# Patient Record
Sex: FEMALE | Race: WHITE | NOT HISPANIC OR LATINO | Employment: FULL TIME | URBAN - METROPOLITAN AREA
[De-identification: names, ages, dates, MRNs, and addresses within clinical notes are randomized per-mention and may not be internally consistent; named-entity substitution may affect disease eponyms.]

---

## 2019-03-21 ENCOUNTER — HISTORICAL (OUTPATIENT)
Dept: ADMINISTRATIVE | Facility: HOSPITAL | Age: 59
End: 2019-03-21

## 2019-03-21 LAB
ABS NEUT (OLG): 2.8 X10(3)/MCL (ref 2.1–9.2)
ALBUMIN SERPL-MCNC: 4.2 GM/DL (ref 3.4–5)
ALBUMIN/GLOB SERPL: 2.21 {RATIO} (ref 1.5–2.5)
ALP SERPL-CCNC: 71 UNIT/L (ref 38–126)
ALT SERPL-CCNC: 17 UNIT/L (ref 7–52)
AST SERPL-CCNC: 16 UNIT/L (ref 15–37)
BILIRUB SERPL-MCNC: 0.5 MG/DL (ref 0.2–1)
BILIRUBIN DIRECT+TOT PNL SERPL-MCNC: 0.1 MG/DL (ref 0–0.5)
BILIRUBIN DIRECT+TOT PNL SERPL-MCNC: 0.4 MG/DL
BUN SERPL-MCNC: 19 MG/DL (ref 7–18)
CALCIUM SERPL-MCNC: 8.8 MG/DL (ref 8.5–10)
CHLORIDE SERPL-SCNC: 106 MMOL/L (ref 98–107)
CO2 SERPL-SCNC: 28 MMOL/L (ref 21–32)
CREAT SERPL-MCNC: 0.72 MG/DL (ref 0.6–1.3)
ERYTHROCYTE [DISTWIDTH] IN BLOOD BY AUTOMATED COUNT: 12.5 % (ref 11.5–17)
GLOBULIN SER-MCNC: 1.9 GM/DL (ref 1.2–3)
GLUCOSE SERPL-MCNC: 112 MG/DL (ref 74–106)
HCT VFR BLD AUTO: 40.4 % (ref 37–47)
HGB BLD-MCNC: 13.6 GM/DL (ref 12–16)
LYMPHOCYTES # BLD AUTO: 2 X10(3)/MCL (ref 0.6–3.4)
LYMPHOCYTES NFR BLD AUTO: 35.3 % (ref 13–40)
MCH RBC QN AUTO: 31.7 PG (ref 27–31.2)
MCHC RBC AUTO-ENTMCNC: 34 GM/DL (ref 32–36)
MCV RBC AUTO: 94 FL (ref 80–94)
MONOCYTES # BLD AUTO: 0.8 X10(3)/MCL (ref 0.1–1.3)
MONOCYTES NFR BLD AUTO: 14 % (ref 0.1–24)
NEUTROPHILS NFR BLD AUTO: 50.7 % (ref 47–80)
PLATELET # BLD AUTO: 230 X10(3)/MCL (ref 130–400)
PMV BLD AUTO: 10.5 FL (ref 9.4–12.4)
POTASSIUM SERPL-SCNC: 4.3 MMOL/L (ref 3.5–5.1)
PROT SERPL-MCNC: 6.1 GM/DL (ref 6.4–8.2)
RBC # BLD AUTO: 4.29 X10(6)/MCL (ref 4.2–5.4)
SODIUM SERPL-SCNC: 133 MMOL/L (ref 136–145)
WBC # SPEC AUTO: 5.6 X10(3)/MCL (ref 4.5–11.5)

## 2019-04-23 ENCOUNTER — HISTORICAL (OUTPATIENT)
Dept: ADMINISTRATIVE | Facility: HOSPITAL | Age: 59
End: 2019-04-23

## 2019-04-23 LAB
APPEARANCE, UA: CLEAR
BACTERIA #/AREA URNS AUTO: ABNORMAL /HPF
BILIRUB UR QL STRIP: NEGATIVE MG/DL
CHOLEST SERPL-MCNC: 216 MG/DL (ref 0–200)
CHOLEST/HDLC SERPL: 4.1 {RATIO}
COLOR UR: ABNORMAL
GLUCOSE (UA): NEGATIVE MG/DL
HDLC SERPL-MCNC: 53 MG/DL (ref 35–60)
HGB UR QL STRIP: ABNORMAL UNIT/L
KETONES UR QL STRIP: NEGATIVE MG/DL
LDLC SERPL CALC-MCNC: 129 MG/DL (ref 0–129)
LEUKOCYTE ESTERASE UR QL STRIP: NEGATIVE UNIT/L
NITRITE UR QL STRIP.AUTO: NEGATIVE
PH UR STRIP: 5 [PH]
PROT UR QL STRIP: NEGATIVE MG/DL
RBC #/AREA URNS HPF: ABNORMAL /HPF
SP GR UR STRIP: <1.005
SQUAMOUS EPITHELIAL, UA: ABNORMAL /LPF
TRIGL SERPL-MCNC: 119 MG/DL (ref 30–150)
UROBILINOGEN UR STRIP-ACNC: 0.2 MG/DL
VLDLC SERPL CALC-MCNC: 23.8 MG/DL
WBC #/AREA URNS AUTO: ABNORMAL /[HPF]

## 2019-07-17 ENCOUNTER — HISTORICAL (OUTPATIENT)
Dept: ADMINISTRATIVE | Facility: HOSPITAL | Age: 59
End: 2019-07-17

## 2019-07-17 LAB
T4 FREE SERPL-MCNC: 1.37 NG/DL (ref 0.76–1.46)
TSH SERPL-ACNC: 2.07 MIU/ML (ref 0.35–4.94)

## 2020-04-22 ENCOUNTER — HISTORICAL (OUTPATIENT)
Dept: ADMINISTRATIVE | Facility: HOSPITAL | Age: 60
End: 2020-04-22

## 2020-04-22 LAB
ABS NEUT (OLG): 4.4 X10(3)/MCL (ref 2.1–9.2)
ALBUMIN SERPL-MCNC: 4.4 GM/DL (ref 3.4–5)
ALBUMIN/GLOB SERPL: 1.83 {RATIO} (ref 1.5–2.5)
ALP SERPL-CCNC: 79 UNIT/L (ref 38–126)
ALT SERPL-CCNC: 24 UNIT/L (ref 7–52)
APPEARANCE, UA: ABNORMAL
AST SERPL-CCNC: 19 UNIT/L (ref 15–37)
BACTERIA #/AREA URNS AUTO: ABNORMAL /HPF
BILIRUB SERPL-MCNC: 0.7 MG/DL (ref 0.2–1)
BILIRUB UR QL STRIP: NEGATIVE MG/DL
BILIRUBIN DIRECT+TOT PNL SERPL-MCNC: 0.1 MG/DL (ref 0–0.5)
BILIRUBIN DIRECT+TOT PNL SERPL-MCNC: 0.6 MG/DL
BUN SERPL-MCNC: 17 MG/DL (ref 7–18)
CALCIUM SERPL-MCNC: 9.1 MG/DL (ref 8.5–10)
CHLORIDE SERPL-SCNC: 106 MMOL/L (ref 98–107)
CHOLEST SERPL-MCNC: 206 MG/DL (ref 0–200)
CHOLEST/HDLC SERPL: 4.2 {RATIO}
CO2 SERPL-SCNC: 29 MMOL/L (ref 21–32)
COLOR UR: YELLOW
CREAT SERPL-MCNC: 0.69 MG/DL (ref 0.6–1.3)
ERYTHROCYTE [DISTWIDTH] IN BLOOD BY AUTOMATED COUNT: 13.2 % (ref 11.5–17)
EST. AVERAGE GLUCOSE BLD GHB EST-MCNC: 111 MG/DL
GLOBULIN SER-MCNC: 2.4 GM/DL (ref 1.2–3)
GLUCOSE (UA): NEGATIVE MG/DL
GLUCOSE SERPL-MCNC: 104 MG/DL (ref 74–106)
HBA1C MFR BLD: 5.5 % (ref 4.4–6.4)
HCT VFR BLD AUTO: 40.6 % (ref 37–47)
HDLC SERPL-MCNC: 49 MG/DL (ref 35–60)
HGB BLD-MCNC: 13.1 GM/DL (ref 12–16)
HGB UR QL STRIP: ABNORMAL UNIT/L
KETONES UR QL STRIP: NEGATIVE MG/DL
LDLC SERPL CALC-MCNC: 133 MG/DL (ref 0–129)
LEUKOCYTE ESTERASE UR QL STRIP: ABNORMAL UNIT/L
LYMPHOCYTES # BLD AUTO: 2.6 X10(3)/MCL (ref 0.6–3.4)
LYMPHOCYTES NFR BLD AUTO: 31.9 % (ref 13–40)
MCH RBC QN AUTO: 30.4 PG (ref 27–31.2)
MCHC RBC AUTO-ENTMCNC: 32 GM/DL (ref 32–36)
MCV RBC AUTO: 94 FL (ref 80–94)
MONOCYTES # BLD AUTO: 1 X10(3)/MCL (ref 0.1–1.3)
MONOCYTES NFR BLD AUTO: 12.1 % (ref 0.1–24)
NEUTROPHILS NFR BLD AUTO: 56 % (ref 47–80)
NITRITE UR QL STRIP.AUTO: NEGATIVE
PH UR STRIP: 5 [PH]
PLATELET # BLD AUTO: 245 X10(3)/MCL (ref 130–400)
PMV BLD AUTO: 10.1 FL (ref 9.4–12.4)
POTASSIUM SERPL-SCNC: 4.4 MMOL/L (ref 3.5–5.1)
PROT SERPL-MCNC: 6.8 GM/DL (ref 6.4–8.2)
PROT UR QL STRIP: NEGATIVE MG/DL
RBC # BLD AUTO: 4.31 X10(6)/MCL (ref 4.2–5.4)
RBC #/AREA URNS HPF: ABNORMAL /HPF
SODIUM SERPL-SCNC: 138 MMOL/L (ref 136–145)
SP GR UR STRIP: 1.01
SQUAMOUS EPITHELIAL, UA: ABNORMAL /LPF
TRIGL SERPL-MCNC: 82 MG/DL (ref 30–150)
UROBILINOGEN UR STRIP-ACNC: 0.2 MG/DL
VLDLC SERPL CALC-MCNC: 16.4 MG/DL
WBC # SPEC AUTO: 8 X10(3)/MCL (ref 4.5–11.5)
WBC #/AREA URNS AUTO: ABNORMAL /[HPF]

## 2020-04-28 ENCOUNTER — HISTORICAL (OUTPATIENT)
Dept: LAB | Facility: HOSPITAL | Age: 60
End: 2020-04-28

## 2021-01-29 ENCOUNTER — HISTORICAL (OUTPATIENT)
Dept: ADMINISTRATIVE | Facility: HOSPITAL | Age: 61
End: 2021-01-29

## 2021-05-11 ENCOUNTER — HISTORICAL (OUTPATIENT)
Dept: ADMINISTRATIVE | Facility: HOSPITAL | Age: 61
End: 2021-05-11

## 2022-04-10 ENCOUNTER — HISTORICAL (OUTPATIENT)
Dept: ADMINISTRATIVE | Facility: HOSPITAL | Age: 62
End: 2022-04-10

## 2022-04-26 VITALS
OXYGEN SATURATION: 96 % | DIASTOLIC BLOOD PRESSURE: 64 MMHG | BODY MASS INDEX: 23.65 KG/M2 | HEIGHT: 68 IN | SYSTOLIC BLOOD PRESSURE: 104 MMHG | WEIGHT: 156.06 LBS

## 2022-05-02 NOTE — HISTORICAL [facility] CERNER
This is a historical note converted from Pradeep. Formatting and pictures may have been removed.  Please reference Pradeep for original formatting and attached multimedia. Chief Complaint  SYNCOPAL EPISODE LAST WEEK. ?FELL AND HIT HER LOWER BACK ON A CHAIR & HAS HAD PAIN THERE EVER SINCE. ?SHE NOW HAS PAIN WITH INSPIRATION.  History of Present Illness  She has been quite fatigued. She may be burned out. She works at least 10 hours a day. 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 She is having a lot of discomfort in her lower back for 3-4 weeks. She fell last week. Her pain in on the left side. Continuous. Hurts with breathing. She is resting. She can walk and lie without problems. She has discomfort with sitting and getting up.  Review of Systems  See HPI.  Physical Exam  Vitals & Measurements  T:?36.7? ?C (Oral)? HR:?96(Peripheral)? BP:?120/60?  HT:?170?cm? WT:?72?kg? BMI:?24.91?  General:?She is a well-developed well-nourished anxious white female no apparent distress.? She has a normal gait and is able to transfer without problems.  Low back:?Normal in appearance, no skin lesions,?she has good flexion?at her waist. ?She has somewhat limited?extension. ?She has slight discomfort with lateral flexion to right.? She has a negative?SLR bilaterally.  Assessment/Plan  1.?Low back pain?M54.5  X-rays pending.? She appears to have?osteoarthritis in her lumbar spine.  Meloxicam 7.5 mg daily. ?She is to call me in 2 weeks with an update.? She is to call me sooner if her symptoms worsen.  Ordered:  Free T4, Routine collect, 07/17/19 16:10:00 CDT, Blood, Order for future visit, Stop date 07/17/19 16:10:00 CDT, Lab Collect, Low back pain  Fatigue, 07/17/19 16:10:00 CDT  Office/Outpatient Visit Level 3 Established [id number] PC, Low back pain  Fatigue, [facility], 07/17/19 16:10:00 CDT  Thyroid Stimulating Hormone, Routine collect, 07/17/19 16:10:00 CDT, Blood, Order for future visit, Stop date 07/17/19 16:10:00 CDT, Lab Collect, Low back pain   Fatigue, 07/17/19 16:10:00 CDT  XR Spine Lumbar 2 or 3 Views, Routine, 07/17/19 15:53:00 CDT, Back Pain, None, Stretcher, Patient Has IV?, Rad Type, Low back pain, [facility], 07/17/19 15:53:00 CDT  ?  2.?Fatigue?R53.83  ?Probably secondary to her work schedule.? She is probably also experiencing some mental fatigue.? Patient advised to get more rest.? Will check?TSH and free T4 to rule out thyroid dysfunction.  Ordered:  Free T4, Routine collect, 07/17/19 16:10:00 CDT, Blood, Order for future visit, Stop date 07/17/19 16:10:00 CDT, Lab Collect, Low back pain  Fatigue, 07/17/19 16:10:00 CDT  Office/Outpatient Visit Level 3 Established [id number] PC, Low back pain  Fatigue, [facility], 07/17/19 16:10:00 CDT  Thyroid Stimulating Hormone, Routine collect, 07/17/19 16:10:00 CDT, Blood, Order for future visit, Stop date 07/17/19 16:10:00 CDT, Lab Collect, Low back pain  Fatigue, 07/17/19 16:10:00 CDT  ?  Orders:  meloxicam, 7.5 mg = 1 tab(s), Oral, Daily, # 30 tab(s), 2 Refill(s), Pharmacy: [id number]  Clinic Follow-up PRN, 07/17/19 16:10:00 CDT, [facility] - [facility], Future Order  Referrals  Clinic Follow-up PRN, 07/17/19 16:10:00 CDT, [facility] - [facility], Future Order   Problem List/Past Medical History  Ongoing  Fatigue  Hypercholesterolemia  Low back pain  Historical  Able to lie down  Activity tolerance  Cataracts  Glasses  Hypertension  Insomnia  Procedure/Surgical History  34299 - LT CATARACT W/IOL 1 STA PHACO (Left) (07/10/2015)  Extracapsular cataract removal with insertion of intraocular lens prosthesis (1 stage procedure), manual or mechanical technique (eg, irrigation and aspiration or phacoemulsification) (07/10/2015)  Insertion of intraocular lens prosthesis at time of cataract extraction, one-stage (07/10/2015)  Phacoemulsification and aspiration of cataract (07/10/2015)  04130 - RT CATARACT W/IOL 1 STA PHACO (Right) (06/30/2015)  Extracapsular cataract removal with insertion of  intraocular lens prosthesis (1 stage procedure), manual or mechanical technique (eg, irrigation and aspiration or phacoemulsification) (06/30/2015)  Insertion of intraocular lens prosthesis at time of cataract extraction, one-stage (06/30/2015)  Phacoemulsification and aspiration of cataract (06/30/2015)  Colonoscopy (12/16/2013)  Hysterectomy (09.2011)  Appendectomy  benign breast lump   Medications  meloxicam 7.5 mg oral tablet, 7.5 mg= 1 tab(s), Oral, Daily, 2 refills  simvastatin 40 mg oral tablet, See Instructions, 3 refills  Allergies  No Known Medication Allergies  Social History  Abuse/Neglect  No, 07/17/2019  Alcohol - Low Risk, 06/29/2015  Current, Wine, 1-2 times per month, 06/29/2015  Employment/School - Low Risk, 06/30/2015  Employed, Work/School description: housekeeping., 06/30/2015  Exercise  Home/Environment - Low Risk, 06/29/2015  Lives with Spouse., 06/29/2015  Nutrition/Health - No Risk, 07/10/2015  Regular, Caffeine intake amount: 2 CUPS OF COFFEE AND 1 SODA PER DAY., 04/23/2019  Substance Use - Denies Substance Abuse, 06/29/2015  Never, 04/23/2019  Tobacco - High Risk, 06/29/2015  10 or more cigarettes (1/2 pack or more)/day in last 30 days, No, 07/17/2019  10 or more cigarettes (1/2 pack or more)/day in last 30 days, Cigarettes, No, 04/23/2019  Current every day smoker, Cigarettes, 15 per day., 06/29/2015  Family History  Heart disease: Father.Negative: Mother.  Metastatic cancer: Mother.  Immunizations  Vaccine Date Status   pneumococcal 13-valent conjugate vaccine 05/31/2016 Recorded   tetanus-diphth toxoids (Td) adult/adol 05/26/2016 Recorded   influenza virus vaccine, inactivated 08/21/2014 Recorded   Health Maintenance  Health Maintenance  ???Pending?(in the next year)  ??? ??OverDue  ??? ? ? ?Diabetes Screening due??and every?  ??? ? ? ?Cervical Cancer Screening due??05/30/14??and every 3??year(s)  ??? ? ? ?Colorectal Screening due??12/16/18??and every 5??year(s)  ??? ? ? ?Alcohol Misuse  Screening due??01/01/19??and every 1??year(s)  ??? ??Due?  ??? ? ? ?ADL Screening due??07/17/19??and every 1??year(s)  ??? ? ? ?Aspirin Therapy for CVD Prevention due??07/17/19??and every 1??year(s)  ??? ? ? ?Depression Screening due??07/17/19??and every?  ??? ??Due In Future?  ??? ? ? ?Obesity Screening not due until??01/01/20??and every 1??year(s)  ??? ? ? ?Blood Pressure Screening not due until??07/16/20??and every 1??year(s)  ??? ? ? ?Body Mass Index Check not due until??07/16/20??and every 1??year(s)  ???Satisfied?(in the past 1 year)  ??? ??Satisfied?  ??? ? ? ?Blood Pressure Screening on??07/17/19.??Satisfied by Shiela Vences LPN  ??? ? ? ?Body Mass Index Check on??07/17/19.??Satisfied by Shiela Vences LPN  ??? ? ? ?Diabetes Screening on??03/21/19.??Satisfied by Lauri Bucio  ??? ? ? ?Lipid Screening on??04/23/19.??Satisfied by Lauri Bucio  ??? ? ? ?Obesity Screening on??07/17/19.??Satisfied by Shiela Vences LPN  ?      X-rays reveal advanced degenerative disc disease at L2-L3.

## 2022-05-02 NOTE — HISTORICAL [facility] CERNER
This is a historical note converted from Cerner. Formatting and pictures may have been removed.  Please reference Cerner for original formatting and attached multimedia. Chief Complaint  CPX/FASTING-NPO  History of Present Illness  Patient presents for wellness exam.  She lives with .  She has been feeling well.  She admits to insomnia intermittently. She is taking trazodone as needed but notes daytime grogginess and vivid dreams.?  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 She had shoulder surgery in 2017 but continues to have pain when working.  Her appetite is good.  She works in housekeeping.??She is active; she does yard work.  She has a few beers a week.  She smokes 7-8 cigarettes daily.  She has 2 cups of coffee and one soda a day.  Colonoscopy: Dr Nair, March 2018-she is unsure of when to return.  GYN:?Dr Irby; Gs UTD.  Review of Systems  GENERAL: No weight loss, no?weight gain, no fever, no fatigue, no chills, +night sweats-menopausal  HEENT: No sore throat, no ear pain, no sinus pressure, no nasal congestion, no rhinorrhea, no decreased hearing, no snoring  VISION: No vision changes, no blurry vision, no double vision, no glaucoma, no cataracts, +glasses, no contacts  LAST EYE EXAM: 2019  NECK: no LAD  CARDIAC: No chest pain, no palpitations, no dyspnea on exertion, no orthopnea  RESPIRATORY: No cough, no wheezing, no sputum production, no?SOB  GI: No abdominal pain, no N/V, no constipation, no diarrhea, no blood in stool,?( -)?family history of Colon Ca  : No dysuria, no hematuria, no frequency, no urgency, no incontinence, +nocturia,?no vaginal discharge or abnormal vaginal bleeding  MUSC/SKEL: No myalgia, no weakness, no edema, no arthralgia, no joint swelling/effusions, +chronic low back pain, +chronic left shoulder pain  SKIN: No rashes, no hives, no itching, no sores  NEURO: No HA, no numbness, no tingling, no weakness, no dizziness  PSYCH: No  anxiety, no depression, no?irritability, no panic attacks,?no s/i, no h/i, no?hallucinations, +intermittent insomnia  ENDO: No polyuria, no polyphagia,? no polydipsia  HEME: No bruising, no bleeding disorders, no signs of anemia.?  Physical Exam  Vitals & Measurements  T:?36.6? ?C (Oral)? HR:?80(Peripheral)? BP:?120/80?  HT:?170.17?cm? WT:?70.9?kg? BMI:?24.48?  General: Well developed, well nourished in no apparent distress, alert and oriented x3  Skin:?No rash or abnormal?lesions  HEENT:?Normocephalic, PERRLA, EOMI, mouth WNL, throat WNL, nares normal, EAC and TM WNL bilaterally  Neck:?FROM, no LAD, no thyroid abnormalities?palpable  Chest:?CTA?bilaterally, no wheezes crackles or rubs  Cardiac: RRR,?no murmurs, rubs, gallops  Abdomen: Soft, nontender,?nondistended, NBSx4, no rebound tenderness or guarding, no HSM  Extremities:?No clubbing, cyanosis, or edema.? Joints WNL, +2 DP/PT pulses bilaterally  Neuro: No sensory or motor defects noted. CN II-XII intact. Gait WNL.  Genital: Defer to GYN  Shoulder: Normal appearance, no limitation of abduction,?no limited internal rotation,?pain with external rotation, - Neers test, - Franks test, - Speeds test, - Liftoff test.?  Assessment/Plan  1.?Wellness examination?Z00.00  CBC, CMP, Lipids, UA, A1C ordered.  Encourage smoking cessation.  Requested colonoscopy results from 2018 with?Korey FERGUSON.  Encourage pt to increase cardiovascular exercise and attempt to obtain at least 150 minutes of moderate aerobic exercise per week or 75 minutes of vigorous aerobic exercise weekly.??  Weight bearing activities discussed to increase bone density.  Ordered:  CBC w/ Auto Diff, Routine collect, 04/22/20 10:46:00 CDT, Blood, Order for future visit, Stop date 04/22/20 10:46:00 CDT, Lab Collect, Wellness examination  Hypercholesterolemia  Insomnia, 04/22/20 10:46:00 CDT  Clinic Follow-Up Wellness, *Est. 04/22/21 3:00:00 CDT, Order for future visit, Wellness examination, ink  [facility]  Comprehensive Metabolic Panel, Routine collect, 04/22/20 10:46:00 CDT, Blood, Order for future visit, Stop date 04/22/20 10:46:00 CDT, Lab Collect, Wellness examination  Hypercholesterolemia  Hyperglycemia  Insomnia, 04/22/20 10:46:00 CDT  Hemoglobin A1c, Routine collect, 04/22/20 10:46:00 CDT, Blood, Order for future visit, Stop date 04/22/20 10:46:00 CDT, Lab Collect, Wellness examination  Hyperglycemia  Hypercholesterolemia, 04/22/20 10:46:00 CDT  Lab Collection Request, 04/22/20 10:46:00 CDT, [facility] - [facility], 04/22/20 10:46:00 CDT, Wellness examination  Hypercholesterolemia  Hyperglycemia  Insomnia  Lipid Panel, Routine collect, 04/22/20 10:46:00 CDT, Blood, Order for future visit, Stop date 04/22/20 10:46:00 CDT, Lab Collect, Wellness examination  Hypercholesterolemia, 04/22/20 10:46:00 CDT  Preventative Health Care Est 40-64 years [id number] PC, Wellness examination  Hypercholesterolemia  Hyperglycemia  Insomnia  Immunization due, [facility] - [facility], 04/22/20 10:49:00 CDT  Urinalysis without Reflex, Routine collect, Urine, Order for future visit, 04/22/20 10:46:00 CDT, Stop date 04/22/20 10:46:00 CDT, Nurse collect, Wellness examination  Hyperglycemia  ?  2.?Hypercholesterolemia?E78.00  Continue current medicine. ?Follow low-cholesterol diet with?physical activity.??  Lipids ordered.  Ordered:  CBC w/ Auto Diff, Routine collect, 04/22/20 10:46:00 CDT, Blood, Order for future visit, Stop date 04/22/20 10:46:00 CDT, Lab Collect, Wellness examination  Hypercholesterolemia  Insomnia, 04/22/20 10:46:00 CDT  Comprehensive Metabolic Panel, Routine collect, 04/22/20 10:46:00 CDT, Blood, Order for future visit, Stop date 04/22/20 10:46:00 CDT, Lab Collect, Wellness examination  Hypercholesterolemia  Hyperglycemia  Insomnia, 04/22/20 10:46:00 CDT  Hemoglobin A1c, Routine collect, 04/22/20 10:46:00 CDT, Blood, Order for future visit, Stop date 04/22/20 10:46:00 CDT, Lab Collect, Wellness examination   Hyperglycemia  Hypercholesterolemia, 04/22/20 10:46:00 CDT  Lab Collection Request, 04/22/20 10:46:00 CDT, [facility] - [facility], 04/22/20 10:46:00 CDT, Wellness examination  Hypercholesterolemia  Hyperglycemia  Insomnia  Lipid Panel, Routine collect, 04/22/20 10:46:00 CDT, Blood, Order for future visit, Stop date 04/22/20 10:46:00 CDT, Lab Collect, Wellness examination  Hypercholesterolemia, 04/22/20 10:46:00 CDT  Preventative Health Care Est 40-64 years 04381 PC, Wellness examination  Hypercholesterolemia  Hyperglycemia  Insomnia  Immunization due, [facility] - [facility], 04/22/20 10:49:00 CDT  ?  3.?Hyperglycemia?R73.9  A1C ordered.  Ordered:  Comprehensive Metabolic Panel, Routine collect, 04/22/20 10:46:00 CDT, Blood, Order for future visit, Stop date 04/22/20 10:46:00 CDT, Lab Collect, Wellness examination  Hypercholesterolemia  Hyperglycemia  Insomnia, 04/22/20 10:46:00 CDT  Hemoglobin A1c, Routine collect, 04/22/20 10:46:00 CDT, Blood, Order for future visit, Stop date 04/22/20 10:46:00 CDT, Lab Collect, Wellness examination  Hyperglycemia  Hypercholesterolemia, 04/22/20 10:46:00 CDT  Lab Collection Request, 04/22/20 10:46:00 CDT, [facility] - [facility], 04/22/20 10:46:00 CDT, Wellness examination  Hypercholesterolemia  Hyperglycemia  Insomnia  Preventative Health Care Est 40-64 years 48131 PC, Wellness examination  Hypercholesterolemia  Hyperglycemia  Insomnia  Immunization due, [facility] - [facility], 04/22/20 10:49:00 CDT  Urinalysis without Reflex, Routine collect, Urine, Order for future visit, 04/22/20 10:46:00 CDT, Stop date 04/22/20 10:46:00 CDT, Nurse collect, Wellness examination  Hyperglycemia  ?  4.?Insomnia?G47.00  Will try trial of?hydroxyzine 25 mg at bedtime as needed.  Informed patient?to call the office if persist?or worsens despite medications.  Ordered:  CBC w/ Auto Diff, Routine collect, 04/22/20 10:46:00 CDT, Blood, Order for future visit, Stop date 04/22/20 10:46:00 CDT, Lab Collect,  Wellness examination  Hypercholesterolemia  Insomnia, 04/22/20 10:46:00 CDT  Comprehensive Metabolic Panel, Routine collect, 04/22/20 10:46:00 CDT, Blood, Order for future visit, Stop date 04/22/20 10:46:00 CDT, Lab Collect, Wellness examination  Hypercholesterolemia  Hyperglycemia  Insomnia, 04/22/20 10:46:00 CDT  Lab Collection Request, 04/22/20 10:46:00 CDT, [facility] - [facility], 04/22/20 10:46:00 CDT, Wellness examination  Hypercholesterolemia  Hyperglycemia  Insomnia  Preventative Health Care Est 40-64 years 84734 PC, Wellness examination  Hypercholesterolemia  Hyperglycemia  Insomnia  Immunization due, [facility] - [facility], 04/22/20 10:49:00 CDT  ?  5.?Immunization due?Z23  She declines Shingrix vaccine; given risks.  Ordered:  Preventative Health Care Est 40-64 years [id number] PC, Wellness examination  Hypercholesterolemia  Hyperglycemia  Insomnia  Immunization due, [facility] - [facility], 04/22/20 10:49:00 CDT  ?  6.?Shoulder pain, left?M25.512  Refilled Mobic to take as needed for pain.  She declines?further work-up or x-rays at this time.  ?  Orders:  hydrOXYzine, 25 mg = 1 tab(s), Oral, Daily, PRN PRN insomnia, # 30 tab(s), 1 Refill(s), Pharmacy: [id number], 170.17, cm, Height/Length Dosing, 04/22/20 10:02:00 CDT, 70.9, kg, Weight Dosing, 04/22/20 10:02:00 CDT  meloxicam, 7.5 mg = 1 tab(s), Oral, Daily, PRN PRN pain, # 90 tab(s), 0 Refill(s), Pharmacy: [facility] ([place]), 170.17, cm, Height/Length Dosing, 04/22/20 10:02:00 CDT, 70.9, kg, Weight Dosing, 04/22/20 10:02:00 CDT  simvastatin, See Instructions, TAKE 1 TABLET BY MOUTH EVERY DAY IN THE EVENING, # 90 tab(s), 3 Refill(s), Pharmacy: [facility] ([place]), 170.17, cm, Height/Length Dosing, 04/22/20 10:02:00 CDT, 70.9, kg, Weight Dosing, 04/22/20 10:02:00 CDT  Referrals  Clinic Follow-Up Wellness, *Est. 04/22/21 3:00:00 CDT, Order for future visit, Wellness examination, [facility]   Problem List/Past Medical  History  Ongoing  Fatigue  Hypercholesterolemia  Low back pain  Historical  Able to lie down  Activity tolerance  Cataracts  Glasses  Hypertension  Insomnia  Procedure/Surgical History  32103 - LT CATARACT W/IOL 1 STA PHACO (Left) (07/10/2015)  Extracapsular cataract removal with insertion of intraocular lens prosthesis (1 stage procedure), manual or mechanical technique (eg, irrigation and aspiration or phacoemulsification) (07/10/2015)  Insertion of intraocular lens prosthesis at time of cataract extraction, one-stage (07/10/2015)  Phacoemulsification and aspiration of cataract (07/10/2015)  77545 - RT CATARACT W/IOL 1 STA PHACO (Right) (06/30/2015)  Extracapsular cataract removal with insertion of intraocular lens prosthesis (1 stage procedure), manual or mechanical technique (eg, irrigation and aspiration or phacoemulsification) (06/30/2015)  Insertion of intraocular lens prosthesis at time of cataract extraction, one-stage (06/30/2015)  Phacoemulsification and aspiration of cataract (06/30/2015)  Colonoscopy (12/16/2013)  Hysterectomy (09.2011)  Appendectomy  benign breast lump   Medications  hydrOXYzine hydrochloride 25 mg oral tablet, 25 mg= 1 tab(s), Oral, Daily, PRN, 1 refills  meloxicam 7.5 mg oral tablet, 7.5 mg= 1 tab(s), Oral, Daily, PRN  simvastatin 40 mg oral tablet, See Instructions, 3 refills  Allergies  No Known Medication Allergies  Social History  Abuse/Neglect  No, 04/22/2020  No, 07/17/2019  Alcohol - Low Risk, 06/29/2015  Current, Beer, 1-2 times per week, 04/22/2020  Current, Wine, 1-2 times per month, 06/29/2015  Employment/School - Low Risk, 06/30/2015  Employed, Work/School description: SELF EMPLOYED., 04/22/2020  Employed, Work/School description: housekeeping., 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  Exercise  Exercise duration: 0., 04/22/2020  Home/Environment - Low Risk, 06/29/2015  Lives with Spouse., 04/22/2020  Lives with Spouse., 06/29/2015  Nutrition/Health - No Risk, 07/10/2015  Regular, Good,  04/22/2020  Regular, Caffeine intake amount: 2 CUPS OF COFFEE AND 1 SODA PER DAY., 04/23/2019  Spiritual/Cultural  Mormonism, 04/22/2020  Substance Use - Denies Substance Abuse, 06/29/2015  Never, 04/22/2020  Never, 04/23/2019  Tobacco - High Risk, 06/29/2015  5-9 cigarettes (between 1/4 to 1/2 pack)/day in last 30 days, No, 04/22/2020  10 or more cigarettes (1/2 pack or more)/day in last 30 days, No, 07/17/2019  10 or more cigarettes (1/2 pack or more)/day in last 30 days, Cigarettes, No, 04/23/2019  Current every day smoker, Cigarettes, 15 per day., 06/29/2015  Family History  Heart disease: Father.Negative: Mother.  Metastatic cancer: Mother.  Immunizations  Vaccine Date Status   pneumococcal 13-valent conjugate vaccine 05/31/2016 Recorded   tetanus-diphth toxoids (Td) adult/adol 05/26/2016 Recorded   influenza virus vaccine, inactivated 08/21/2014 Recorded   Health Maintenance  Health Maintenance  ???Pending?(in the next year)  ??? ??OverDue  ??? ? ? ?Diabetes Screening due??and every?  ??? ? ? ?Cervical Cancer Screening due??05/30/14??and every 3??year(s)  ??? ? ? ?Alcohol Misuse Screening due??01/01/20??and every 1??year(s)  ??? ??Due?  ??? ? ? ?ADL Screening due??04/22/20??and every 1??year(s)  ??? ? ? ?Aspirin Therapy for CVD Prevention due??04/22/20??and every 1??year(s)  ??? ? ? ?Depression Screening due??04/22/20??and every?  ??? ??Due In Future?  ??? ? ? ?Breast Cancer Screening not due until??07/29/20??and every 2??year(s)  ??? ? ? ?Obesity Screening not due until??01/01/21??and every 1??year(s)  ???Satisfied?(in the past 1 year)  ??? ??Satisfied?  ??? ? ? ?Blood Pressure Screening on??04/22/20.??Satisfied by Osman Lopez LPN  ??? ? ? ?Body Mass Index Check on??04/22/20.??Satisfied by Osman Lopez LPN  ??? ? ? ?Obesity Screening on??04/22/20.??Satisfied by Osman Lopez LPN  ?      Patients condition discussed in detail with nurse practitioner.  I have?reviewed and agree with plan of care  and follow-up.

## 2022-05-02 NOTE — HISTORICAL [facility] CERNER
This is a historical note converted from Pradeep. Formatting and pictures may have been removed.  Please reference Pradeep for original formatting and attached multimedia. Chief Complaint  Cough, SOB- Smoker  History of Present Illness  She has had a dry cough. Her throat is dry and scratchy. ? Her chest is sore from coughing. + wheezing. No tightness. + dyspnea. She feels her voice is not right. Her nose runs when she coughs. No sneezing. She has been coughing for 6 weeks. No fever/chills.  She has been smoking 4-5 cigarettes/day.?The cough?keeps her up at night. ?She does admit to a lot of exposure to dust and cleaning chemicals.  Review of Systems  See HPI.  Physical Exam  Vitals & Measurements  T:?36.7? ?C (Oral)? HR:?87(Peripheral)? BP:?110/60? SpO2:?98%?  HT:?172.00?cm? WT:?70.600?kg? BMI:?23.86?  Gen: well developed, well nourished white femalewho?appears to be in no apparent distress.? She coughs regularly.  Head/Neck:? normocephalic, atraumatic, supple,-adenopathy,?-stiffness.?  Mouth/Oropharynx:?-normal dentition,?-tongue abnormalities,-mucosal lesions,? + erythema,-exudate,?-edema of posterior pharynx,-edema of tonsils,??  -?tonsillar exudate,+postnasal drainage,-cobblestoning.  Nares:?+erythema,-edema,-discharge.?  CV:? regular without murmurs, rubs, gallops.?  Chest:?clearto auscultation bilaterally,?-wheezing,-crackles,-rhonchi.?  ?  Assessment/Plan  1.?Cough?R05  Chest x-ray is normal.  Her spirometry is?within normal limits.  Trial of Symbicort?160/4.5: 2 puffs twice daily.  Codeine with guaifenesin: 1 teaspoon every 6 hours as needed for cough.?  Zyrtec or Allegra daily.  His cough may be secondary to allergic exposure?from dust?and/or cleaning chemicals.  Ordered:  Clinic Follow-up PRN, 05/11/21 16:43:00 CDT, [facility] - [facility], Future Order  Office/Outpatient Visit Level 4 Established 62220 PC, Cough  SOB (shortness of breath), [facility]K AMB - [facility], 05/11/21 16:43:00 CDT  XR Chest 2 Views, Routine,  05/11/21 15:52:00 CDT, Other (please specify), None, Stretcher, Patient Has IV?, Rad Type, Cough  SOB (shortness of breath), Not Scheduled, [facility], 05/11/21 15:52:00 CDT  ?  2.?SOB (shortness of breath)?R06.02  ?Patient experiences?some shortness of breath after?coughing spells.  Her spirometry is within normal limits.  Ordered:  Clinic Follow-up PRN, 05/11/21 16:43:00 CDT, [facility] - [facility], Future Order  Office/Outpatient Visit Level 4 Established 14803 PC, Cough  SOB (shortness of breath), [facility], 05/11/21 16:43:00 CDT  XR Chest 2 Views, Routine, 05/11/21 15:52:00 CDT, Other (please specify), None, Stretcher, Patient Has IV?, Rad Type, Cough  SOB (shortness of breath), Not Scheduled, [facility], 05/11/21 15:52:00 CDT  ?  Orders:  codeine-guaifenesin, 7.5 mL, Oral, q6hr, PRN PRN for cough, Quantity greater than 7 days is medically necessary., # 180 mL, 0 Refill(s), Pharmacy: [id number], 172, cm, Height/Length Dosing, 05/11/21 15:50:00 CDT, 70.6, kg, Weight Dosing, 05/11/21 15:50:00 CDT  hydrochlorothiazide-lisinopril, See Instructions, TAKE ONE TABLET BY MOUTH ONE TIME DAILY , # 30 tab(s), 5 Refill(s), Pharmacy: [id number], 172, cm, Height/Length Dosing, 05/11/21 15:50:00 CDT, 70.6, kg, Weight Dosing, 05/11/21 15:50:00 CDT  Referrals  Clinic Follow-up PRN, 05/11/21 16:43:00 CDT, [facility] - [facility], Future Order   Problem List/Past Medical History  Ongoing  Fatigue  Hypercholesterolemia  Hypertension  Low back pain  Historical  Able to lie down  Activity tolerance  Cataracts  Glasses  Hypertension  Insomnia  Procedure/Surgical History  Colonoscopy (03/01/2019)  99262 - LT CATARACT W/IOL 1 STA PHACO (Left) (07/10/2015)  Extracapsular cataract removal with insertion of intraocular lens prosthesis (1 stage procedure), manual or mechanical technique (eg, irrigation and aspiration or phacoemulsification) (07/10/2015)  Insertion of intraocular lens  prosthesis at time of cataract extraction, one-stage (07/10/2015)  Phacoemulsification and aspiration of cataract (07/10/2015)  13842 - RT CATARACT W/IOL 1 STA PHACO (Right) (06/30/2015)  Extracapsular cataract removal with insertion of intraocular lens prosthesis (1 stage procedure), manual or mechanical technique (eg, irrigation and aspiration or phacoemulsification) (06/30/2015)  Insertion of intraocular lens prosthesis at time of cataract extraction, one-stage (06/30/2015)  Phacoemulsification and aspiration of cataract (06/30/2015)  Colonoscopy (12/16/2013)  Hysterectomy (09.2011)  Appendectomy  benign breast lump   Medications  citalopram 40 mg oral tablet, 40 mg= 1 tab(s), Oral, Daily  citalopram 40 mg oral tablet, 40 mg= 1 tab(s), Oral, Daily, 1 refills  cloniDINE 0.1 mg oral tablet, 0.1 mg= 1 tab(s), Oral, BID, PRN  codeine-guaifenesin 7.5 mg-225 mg/5 mL oral liquid, 7.5 mL, Oral, q6hr, PRN  hydrochlorothiazide-lisinopril 12.5 mg-10 mg oral tablet, See Instructions  hydrochlorothiazide-lisinopril 12.5 mg-10 mg oral tablet, 1 tab(s), Oral, Daily  hydrOXYzine hydrochloride 25 mg oral tablet, 25 mg= 1 tab(s), Oral, Daily, PRN, 5 refills  meloxicam 7.5 mg oral tablet, 7.5 mg= 1 tab(s), Oral, Daily, PRN, 1 refills  simvastatin 40 mg oral tablet, See Instructions, 1 refills  traMADol 50 mg oral tablet, 50 mg= 1 tab(s), Oral, q6hr  Allergies  No Known Medication Allergies  Social History  Abuse/Neglect  No, 03/10/2021  Alcohol - Low Risk, 06/29/2015  Current, Beer, 1-2 times per week, 04/22/2020  Employment/School - Low Risk, 06/30/2015  Employed, Work/School description: SELF EMPLOYED., 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 16 Years (Age started)., 05/11/2021  Current every day smoker, Cigarettes, 15 per day., 06/29/2015  Family History  Heart disease: Father.Negative: Mother.  Metastatic cancer: Mother.  Immunizations  Vaccine Date Status   COVID-19 MRNA, LNP-S, PF- Pfizer 04/16/2021 Given   COVID-19 MRNA, LNP-S, PF- Pfizer 03/26/2021 Given   pneumococcal 13-valent conjugate vaccine 05/31/2016 Recorded   tetanus-diphth toxoids (Td) adult/adol 05/26/2016 Recorded   influenza virus vaccine, inactivated 08/21/2014 Recorded   Health Maintenance  Health Maintenance  ???Pending?(in the next year)  ??? ??OverDue  ??? ? ? ?Influenza Vaccine due??10/01/20??and every 1??day(s)  ??? ? ? ?Smoking Cessation due??01/01/21??and every 1??year(s)  ??? ? ? ?Alcohol Misuse Screening due??01/02/21??and every 1??year(s)  ??? ??Due?  ??? ? ? ?ADL Screening due??05/11/21??and every 1??year(s)  ??? ? ? ?Aspirin Therapy for CVD Prevention due??05/11/21??and every 1??year(s)  ??? ? ? ?Breast Cancer Screening due??05/11/21??Unknown Frequency  ??? ? ? ?Depression Screening due??05/11/21??Unknown Frequency  ??? ? ? ?Hypertension Management-Education due??05/11/21??and every 1??year(s)  ??? ? ? ?Lung Cancer Screening due??05/11/21??and every 1??year(s)  ??? ? ? ?Zoster Vaccine due??05/11/21??Unknown Frequency  ??? ??Due In Future?  ??? ? ? ?Obesity Screening not due until??01/01/22??and every 1??year(s)  ??? ? ? ?Hypertension Management-BMP not due until??03/10/22??and every 1??year(s)  ???Satisfied?(in the past 1 year)  ??? ??Satisfied?  ??? ? ? ?Blood Pressure Screening on??05/11/21.??Satisfied by Shiela Vences LPN  ??? ? ? ?Body Mass Index Check on??05/11/21.??Satisfied by Shiela Vences LPN  ???  ? ? ?Diabetes Screening on??03/10/21.??Satisfied by Juliane Riddle  ??? ? ? ?Hypertension Management-Blood Pressure on??05/11/21.??Satisfied by Shiela Vences LPN  ??? ? ? ?Influenza Vaccine on??03/15/21.??Satisfied by Shiela Vences LPN  ??? ? ? ?Obesity Screening on??05/11/21.??Satisfied by Shiela Vences LPN  ?

## 2022-05-02 NOTE — HISTORICAL [facility] CERNER
This is a historical note converted from Pradeep. Formatting and pictures may have been removed.  Please reference Pradeep for original formatting and attached multimedia. Chief Complaint  R FOOT PAIN RADIATING UP LEG XS 1 1/2 WEEKS  History of Present Illness  Patient has been having right lower extremity pain x1.5 weeks.? She denies any injuries, accidents or trauma.? She states she has had increased work recently has been working 13-hour days?5 days a week for last few weeks. ?She denies any skin discoloration, erythema or warmth.? She states she has increased discomfort with weightbearing.? She also states the pain radiates into her?right medial?thigh?groin region at times.? She denies any knee pain.  She states her mother had multiple blood clots. Her sister and mother have bad veins.  Patient denies any chest pain or shortness of breath.  Review of Systems  See HPI.  Physical Exam  Vitals & Measurements  T:?36.9? ?C (Oral)? HR:?86(Peripheral)? BP:?140/80? SpO2:?98%?  HT:?172.72?cm? HT:?172.72?cm? WT:?70.600?kg? WT:?70.6?kg? BMI:?23.67?  General:?She is a well-developed well-nourished white female who appears to be in some mild discomfort.  She is sitting with her leg elevated.  Right lower extremity:?Normal in appearance,?prominent vasculature: spider veins?with few varicosities note?noted.?She has been?marked sensitivity to palpation from her dorsal?ankle to her mid shin.? No skin changes, i.e.?bruising or erythema noted.? She reports increased discomfort with plantar and dorsiflexion.? Her calf is nontender to palpation.? No cords noted. ?She is able to flex and extend her knee?but she resists due to?discomfort in her shin.? She walks gingerly.  Right thigh: She has some spider veins noted medially.? She points to the medial thigh?as is having a sharp shooting pain.? No abnormalities palpated. ?No skin changes noted.  Assessment/Plan  1.?Lower extremity pain, right?M79.604  Patient with 1.5-week history of  marked?right lower extremity pain. ?No history of trauma or accidents.  Patient is having difficulty with ambulation.  Besides?marked tenderness to?palpation, there are no abnormalities found on examination.?  Patients mother?has a history of multiple DVTs.  We will schedule patient for an ultrasound?of her right lower extremity?to rule out?DVT.  ER precautions given.  Ordered:  Clinic Follow-up PRN, 01/29/21 11:10:00 CST, [facility] - [facility], Future Order  External Referral, Right lower extremity pain, Vascular, 01/29/21 11:10:00 CST, Lower extremity pain, right  Office/Outpatient Visit Level 4 Established [id number] PC, Lower extremity pain, right, [facility] - [facility], 01/29/21 11:10:00 CST  US NIVA Venous Lower Ext Right, 01/29/21 11:14:00 CST, Stat, Stop date 01/29/21 11:14:00 CST, Ambulatory, Patient has IV, Standard Precautions, Envision, Lower extremity pain, right  ?  Referrals  External Referral, Right lower extremity pain, Vascular, 01/29/21 11:10:00 CST, Lower extremity pain, right  Clinic Follow-up PRN, 01/29/21 11:10:00 CST, [facility] - [facility], Future Order   Problem List/Past Medical History  Ongoing  Fatigue  Hypercholesterolemia  Low back pain  Historical  Able to lie down  Activity tolerance  Cataracts  Glasses  Hypertension  Insomnia  Procedure/Surgical History  Colonoscopy (03/01/2019)  86778 - LT CATARACT W/IOL 1 STA PHACO (Left) (07/10/2015)  Extracapsular cataract removal with insertion of intraocular lens prosthesis (1 stage procedure), manual or mechanical technique (eg, irrigation and aspiration or phacoemulsification) (07/10/2015)  Insertion of intraocular lens prosthesis at time of cataract extraction, one-stage (07/10/2015)  Phacoemulsification and aspiration of cataract (07/10/2015)  23184 - RT CATARACT W/IOL 1 STA PHACO (Right) (06/30/2015)  Extracapsular cataract removal with insertion of intraocular lens prosthesis (1 stage procedure), manual or mechanical technique (eg, irrigation and aspiration or  phacoemulsification) (06/30/2015)  Insertion of intraocular lens prosthesis at time of cataract extraction, one-stage (06/30/2015)  Phacoemulsification and aspiration of cataract (06/30/2015)  Colonoscopy (12/16/2013)  Hysterectomy (09.2011)  Appendectomy  benign breast lump   Medications  hydrOXYzine hydrochloride 25 mg oral tablet, 25 mg= 1 tab(s), Oral, Daily, PRN, 5 refills  meloxicam 7.5 mg oral tablet, 7.5 mg= 1 tab(s), Oral, Daily, PRN, 1 refills  simvastatin 40 mg oral tablet, See Instructions, 1 refills  Allergies  No Known Medication Allergies  Social History  Abuse/Neglect  No, 01/29/2021  No, 04/22/2020  No, 07/17/2019  Alcohol - Low Risk, 06/29/2015  Current, Beer, 1-2 times per week, 04/22/2020  Current, Wine, 1-2 times per month, 06/29/2015  Employment/School - Low Risk, 06/30/2015  Employed, Work/School description: SELF EMPLOYED., 04/22/2020  Employed, Work/School description: housekeeping., 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  Exercise  Exercise duration: 0., 04/22/2020  Home/Environment - Low Risk, 06/29/2015  Lives with Spouse., 04/22/2020  Lives with Spouse., 06/29/2015  Nutrition/Health - No Risk, 07/10/2015  Regular, Good, 04/22/2020  Regular, Caffeine intake amount: 2 CUPS OF COFFEE AND 1 SODA PER DAY., 04/23/2019  Spiritual/Cultural  Roman Catholic, 04/22/2020  Substance Use - Denies Substance Abuse, 06/29/2015  Never, 04/22/2020  Never, 04/23/2019  Tobacco - High Risk, 06/29/2015  10 or more cigarettes (1/2 pack or more)/day in last 30 days, No, 01/29/2021  5-9 cigarettes (between 1/4 to 1/2 pack)/day in last 30 days, No, 04/22/2020  10 or more cigarettes (1/2 pack or more)/day in last 30 days, No, 07/17/2019  10 or more cigarettes (1/2 pack or more)/day in last 30 days, Cigarettes, No, 04/23/2019  Current every day smoker, Cigarettes, 15 per day., 06/29/2015  Family History  Heart disease: Father.Negative: Mother.  Metastatic cancer: Mother.  Immunizations  Vaccine Date Status   pneumococcal 13-valent conjugate  vaccine 05/31/2016 Recorded   tetanus-diphth toxoids (Td) adult/adol 05/26/2016 Recorded   influenza virus vaccine, inactivated 08/21/2014 Recorded   Health Maintenance  Health Maintenance  ???Pending?(in the next year)  ??? ??OverDue  ??? ? ? ?Cervical Cancer Screening due??05/30/14??and every 3??year(s)  ??? ? ? ?Breast Cancer Screening due??07/29/20??and every 2??year(s)  ??? ? ? ?Influenza Vaccine due??10/01/20??and every 1??day(s)  ??? ? ? ?Smoking Cessation due??01/01/21??and every 1??year(s)  ??? ??Due?  ??? ? ? ?Alcohol Misuse Screening due??01/02/21??and every 1??year(s)  ??? ? ? ?ADL Screening due??01/29/21??and every 1??year(s)  ??? ? ? ?Aspirin Therapy for CVD Prevention due??01/29/21??and every 1??year(s)  ??? ? ? ?Depression Screening due??01/29/21??Unknown Frequency  ??? ? ? ?Lung Cancer Screening due??01/29/21??and every 1??year(s)  ??? ? ? ?Zoster Vaccine due??01/29/21??Unknown Frequency  ??? ??Due In Future?  ??? ? ? ?Obesity Screening not due until??01/01/22??and every 1??year(s)  ???Satisfied?(in the past 1 year)  ??? ??Satisfied?  ??? ? ? ?Blood Pressure Screening on??01/29/21.??Satisfied by Osman Lopez LPN  ??? ? ? ?Body Mass Index Check on??01/29/21.??Satisfied by Osman Lopez LPN  ??? ? ? ?Diabetes Screening on??04/22/20.??Satisfied by Lauri Bucio  ??? ? ? ?Influenza Vaccine on??01/29/21.??Satisfied by Osman Lopez LPN  ??? ? ? ?Lipid Screening on??04/22/20.??Satisfied by Lili Chen  ??? ? ? ?Obesity Screening on??01/29/21.??Satisfied by Osman Lopez LPN  ?      X-rays?of right lower extremity are?normal.

## 2022-05-02 NOTE — HISTORICAL [facility] CERN[facility]
This is a historical note converted from Pradeep. Formatting and pictures may have been removed.  Please reference Pradeep for original formatting and attached multimedia. Chief Complaint  ANNUAL WELLNESS PHYSICAL- NPO- PT HAD LABS DONE HERE ?FOR THE COLONOSCOPY THAT SHE HAD DONE 2 WEEKS AGO  History of Present Illness  Patient presents for wellness exam.  She has her [place] family at her house. They are 10 of them.  She has been feeling well.  She sleeps well.  Her appetite is good.  She works in housekeeping. ?She lives with her spouse. She is active; she does yard work.  She has a few beers a week.  She smokes less than?a half a pack a day.  She has 2 cups of coffee and one soda a day.  Colonoscopy: Dr Nair, March 2018.  She is up to date with her mammograms.  She sees Dr Irby.  Review of Systems  Constitutional:?no?weight gain,?no?weight loss,?no?fatigue, ?no?fever, ?no?chills, ?no?weakness, ?no?trouble sleeping  Eyes: ?no?vision loss/changes,?+?glasses,??no?pain,??no?redness,??no?blurry or double vision,??no?flashing lights,??no?glaucoma,??no?cataracts  Last eye exam:?about 1 month ago  Neck: ?no?lymphadenopathy,??no?thyroid abnormalities,??no?bruits,??no?stiffness  Ears:?no?decreased hearing,??no?tinnitus,??no?earache,??no?drainage?  Nose:?no?congestion,??no?rhinorrhea,??no?epistaxis,??no?sinus pressure, mild seasonal allergies  Throat/Oral:?no?sore throat,??no?hoarseness, ?no?dental caries,??no?gum bleeding,??no?oral lesions  Cardiovascular:?no?chest pain, palpitations, or tightness,?no?dyspnea with exertion,??no?orthopnea,??no?paroxysmal nocturnal dyspnea  Respiratory:?no?cough,?no?sputum,??no?hemoptysis,??no?dyspnea,??no?wheezing,??no?pleuritic chest pain?  Gastrointestinal:?no?abdominal pain,?no?nausea,?no?vomiting,??no?heartburn,??no?dysphagia or odynophagia,??no?diarrhea,??no?constipation,??no?melena,??no?hematochezia,?no?jaundice  Urinary:?no?frequency,??no?urgency,??no?burning or  pain,?no?hematuria,??no?incontinence,??no?hesitancy,??no?incomplete voiding,??no?flank pain,??no?nocturia  Musculoskeletal:?no?myalgias,?no?arthralgias,?no?neck pain,??no?back pain,??no?swelling of extremities  Skin:?no?rashes,?no?sores,?no?non-healing wounds  Neurologic:?no?headaches,?no?dizziness/lightheadedness,?no?tremors,?no?paresthesias,??no?seizures,??no?muscle weakness  Psychiatric:?no?depression/sadness,?no?anhedonia,?no?irritability,?no?suicidal ideations,?no?anxiety,?no?panic attacks  Endocrine:?no?hot or cold intolerance,?no?sweating,?no?polyuria,?no?polydipsia,?no?polyphagia  Hematologic:?no?bruising,??no?bleeding disorders?  Physical Exam  Vitals & Measurements  T:?36.7? ?C (Oral)? HR:?92(Peripheral)? BP:?140/80?  HT:?170?cm? WT:?70.6?kg? BMI:?24.43?  PHYSICAL EXAMINATION:  GENERAL: The patient is a well-developed, well-nourished female in no?apparent distress. She is alert and oriented x 4.  HEENT: Head is normocephalic and atraumatic. Extraocular muscles are intact. Pupils are equal, round, and reactive to light and accommodation. Nares appeared normal. Mouth is well hydrated and without lesions. Mucous membranes are moist. Posterior pharynx clear of any exudate or lesions.  NECK: Supple. No carotid bruits.? No lymphadenopathy or thyromegaly.  LUNGS: Clear to auscultation.  HEART: Regular rate and rhythm without murmurs, rubs or gallops.  ABDOMEN: Soft, nontender, and nondistended.? Positive bowel sounds.? No hepatosplenomegaly was noted.  EXTREMITIES: Without any cyanosis, clubbing, rash, lesions or edema.  NEUROLOGIC: Cranial nerves II through XII are grossly intact.? No motor or sensory deficits.? Cerebellar function intact.  SKIN: No ulceration or induration present.  ?  ?  Assessment/Plan  1.?Wellness examination?Z00.00  ?Patient has been feeling well.? She is without complaints.? She occasionally has some right shoulder discomfort which she attributes to her work.  Ordered:  Clinic Follow Up  Physical, *Est. 04/23/20 3:00:00 CDT, Order for future visit, Wellness examination, [facility]  Lab Collection Request, 04/23/19 9:49:00 CDT, [facility] - [facility], 04/23/19 9:49:00 CDT  Preventative Health Care Est 40-64 years [id number] PC, Wellness examination, [facility], 04/23/19 9:49:00 CDT  Urinalysis Complete no reflex, Routine collect, Urine, Order for future visit, 04/23/19 9:49:00 CDT, Stop date 04/23/19 9:49:00 CDT, Nurse collect, Wellness examination  ?  2.?Hypercholesterolemia?E78.00  ?She is compliant with her medication; refills sent.? Will check lipid panel today.  Ordered:  Lab Collection Request, 04/23/19 9:49:00 CDT, [facility], 04/23/19 9:49:00 CDT  Lipid Panel, Routine collect, 04/23/19 9:49:00 CDT, Blood, Order for future visit, Stop date 04/23/19 9:49:00 CDT, Lab Collect, Hypercholesterolemia, 04/23/19 9:49:00 CDT  ?  Orders:  simvastatin, See Instructions, TAKE 1 TABLET BY MOUTH EVERY DAY IN THE EVENING, # 90 tab(s), 3 Refill(s), Pharmacy: [facility]   Problem List/Past Medical History  Ongoing  Hypercholesterolemia  Historical  Able to lie down  Activity tolerance  Cataracts  Glasses  Hypertension  Insomnia  Procedure/Surgical History  24664 - LT CATARACT W/IOL 1 STA PHACO (Left) (07/10/2015)  Extracapsular cataract removal with insertion of intraocular lens prosthesis (1 stage procedure), manual or mechanical technique (eg, irrigation and aspiration or phacoemulsification) (07/10/2015)  Insertion of intraocular lens prosthesis at time of cataract extraction, one-stage (07/10/2015)  Phacoemulsification and aspiration of cataract (07/10/2015)  72178 - RT CATARACT W/IOL 1 STA PHACO (Right) (06/30/2015)  Extracapsular cataract removal with insertion of intraocular lens prosthesis (1 stage procedure), manual or mechanical technique (eg, irrigation and aspiration or phacoemulsification) (06/30/2015)  Insertion of intraocular lens prosthesis at time of cataract extraction, one-stage  (06/30/2015)  Phacoemulsification and aspiration of cataract (06/30/2015)  Colonoscopy (12/16/2013)  Hysterectomy (09.2011)  Appendectomy  benign breast lump   Medications  simvastatin 40 mg oral tablet, See Instructions, 3 refills  Allergies  No Known Medication Allergies  Social History  Alcohol - Low Risk, 06/29/2015  Current, Wine, 1-2 times per month, 06/29/2015  Employment/School - Low Risk, 06/30/2015  Employed, Work/School description: housekeeping., 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  Metastatic cancer: Mother.  Immunizations  Vaccine Date Status   pneumococcal 13-valent conjugate vaccine 05/31/2016 Recorded   tetanus-diphth toxoids (Td) adult/adol 05/26/2016 Recorded   influenza virus vaccine, inactivated 08/21/2014 Recorded   Health Maintenance  Health Maintenance  ???Pending?(in the next year)  ??? ??OverDue  ??? ? ? ?Diabetes Screening due??and every?  ??? ? ? ?Cervical Cancer Screening due??05/30/14??and every 3??year(s)  ??? ? ? ?Colorectal Screening due??12/16/18??and every 5??year(s)  ??? ??Due?  ??? ? ? ?ADL Screening due??04/23/19??and every 1??year(s)  ??? ? ? ?Alcohol Misuse Screening due??04/23/19??and every 1??year(s)  ??? ? ? ?Aspirin Therapy for CVD Prevention due??04/23/19??and every 1??year(s)  ??? ? ? ?Depression Screening due??04/23/19??and every?  ??? ??Due In Future?  ??? ? ? ?Blood Pressure Screening not due until??04/22/20??and every 1??year(s)  ??? ? ? ?Body Mass Index Check not due  until??04/22/20??and every 1??year(s)  ???Satisfied?(in the past 1 year)  ??? ??Satisfied?  ??? ? ? ?Blood Pressure Screening on??04/23/19.??Satisfied by Shiela Vences LPN  ??? ? ? ?Body Mass Index Check on??04/23/19.??Satisfied by Shiela Vences LPN  ??? ? ? ?Diabetes Screening on??03/21/19.??Satisfied by Lauri Bucio  ??? ? ? ?Obesity Screening on??04/23/19.??Satisfied by Shiela Vences LPN  ?  ?

## 2022-06-20 PROBLEM — G47.00 INSOMNIA: Status: ACTIVE | Noted: 2022-06-20

## 2022-06-20 PROBLEM — F32.A DEPRESSION: Status: ACTIVE | Noted: 2022-06-20

## 2022-06-20 PROBLEM — Z00.00 WELLNESS EXAMINATION: Status: ACTIVE | Noted: 2022-06-20

## 2022-06-20 PROBLEM — E78.00 HYPERCHOLESTEROLEMIA: Status: ACTIVE | Noted: 2022-06-20

## 2022-06-20 PROBLEM — I10 ESSENTIAL (PRIMARY) HYPERTENSION: Status: ACTIVE | Noted: 2022-06-20

## 2022-09-19 PROBLEM — Z00.00 WELLNESS EXAMINATION: Status: RESOLVED | Noted: 2022-06-20 | Resolved: 2022-09-19

## 2023-03-01 PROBLEM — R82.90 ABNORMAL URINE ODOR: Status: ACTIVE | Noted: 2023-03-01

## 2023-03-01 PROBLEM — R42 VERTIGO: Status: ACTIVE | Noted: 2023-03-01

## 2023-10-30 PROBLEM — Z72.0 TOBACCO USE: Status: ACTIVE | Noted: 2023-10-30

## 2023-10-31 PROBLEM — J06.9 UPPER RESPIRATORY TRACT INFECTION: Status: ACTIVE | Noted: 2023-10-31

## 2023-11-28 PROBLEM — F51.01 PRIMARY INSOMNIA: Status: ACTIVE | Noted: 2022-06-20

## 2023-12-04 PROCEDURE — 82607 VITAMIN B-12: CPT | Performed by: NURSE PRACTITIONER

## 2024-03-04 PROBLEM — Z00.00 WELLNESS EXAMINATION: Status: RESOLVED | Noted: 2022-06-20 | Resolved: 2024-03-04

## 2024-08-13 NOTE — PROGRESS NOTES
"Sleep Apnea (Possible sleep apnea snoring a lot), Snoring, and Fatigue       HPI:    Patient presents reporting depression.  She is stressed over personal situation and finances.  She feels sad.  She denies crying spells.  She feels helpless and hopeless at times.  She denies suicidal or harmful thoughts.    She has problems falling asleep.  Her appetite has been normal.    She also reports having a lot of anxiety; she has problems with relaxing and she worries a lot.    She also reports snoring with non restorative sleep and excessive daytime sleepiness.  She is interested in a home sleep study.      Current Outpatient Medications   Medication Instructions    amitriptyline (ELAVIL) 25 mg, Oral, Nightly PRN    diazePAM (VALIUM) 5 mg, 2 times daily    FLUoxetine 20 mg, Oral, Daily    lisinopriL-hydrochlorothiazide (PRINZIDE,ZESTORETIC) 10-12.5 mg per tablet 1 tablet, Oral, Daily    multivitamin with minerals tablet 1 tablet, Oral, Daily    simvastatin (ZOCOR) 40 mg, Oral, Nightly         ROS:    See HPI.      PE:    ..Visit Vitals  /80   Pulse 84   Temp 98.1 °F (36.7 °C)   Ht 5' 7" (1.702 m)   Wt 63.5 kg (140 lb)   SpO2 96%   BMI 21.93 kg/m²        General: She is well-developed well-nourished white female in no apparent distress.  She is alert and oriented.  Oropharynx: Mallampati II  Chest: Clear to auscultation bilaterally.    CV: Regular rate rhythm without murmurs rubs or gallops.        1. Current moderate episode of major depressive disorder without prior episode  Overview:  08/15/2024:   Patient reports sadness and depression for the past few months.  She also has anxiety and worries a lot.  She has feelings of helplessness and hopelessness.  She reports sleep issues.  Start Prozac 20 mg daily; patient advised it may take 10-14 days to notice a difference and 4-6 weeks for full effect of medication.  Patient advised to contact me should symptoms worsen or new symptoms develop.    Follow-up office visit " in one-month.      2. Primary insomnia  Overview:  Stable without meds.     08/15/2024:   Patient reports problems with falling asleep.    Trial of Elavil 25 mg in the evening.  Patient advised to call with update.      3. Obstructive sleep apnea syndrome  Overview:  08/15/2024:   Patient reports snoring, non restorative sleep and excessive daytime sleepiness.    Due to reported snoring as well as non restorative sleep and excessive daytime sleepiness with hypertension and mood disorder, it would be medically prudent to do a home sleep evaluation.    Orders:  -     Home Sleep Study; Future; Expected date: 08/15/2024    4. Essential (primary) hypertension  Overview:  Stable and well controlled at this time. Continue current treatment. Limit salt in diet. Monitor BP at home.     08/15/2024:   Her blood pressure is well controlled; continue lisinopril HCT.  Refills sent.      Orders:  -     lisinopriL-hydrochlorothiazide (PRINZIDE,ZESTORETIC) 10-12.5 mg per tablet; Take 1 tablet by mouth once daily.  Dispense: 90 tablet; Refill: 3    Other orders  -     FLUoxetine 20 MG capsule; Take 1 capsule (20 mg total) by mouth once daily.  Dispense: 30 capsule; Refill: 1  -     amitriptyline (ELAVIL) 25 MG tablet; Take 1 tablet (25 mg total) by mouth nightly as needed for Insomnia.  Dispense: 14 tablet; Refill: 0              ..Follow up in about 1 month (around 9/15/2024) for Follow Up.       Future Appointments   Date Time Provider Department Center   9/16/2024  2:00 PM Adal Dempsey MD [facility]

## 2024-08-15 ENCOUNTER — OFFICE VISIT (OUTPATIENT)
Dept: PRIMARY CARE CLINIC | Facility: CLINIC | Age: 64
End: 2024-08-15
Payer: COMMERCIAL

## 2024-08-15 VITALS
BODY MASS INDEX: 21.97 KG/M2 | TEMPERATURE: 98 F | SYSTOLIC BLOOD PRESSURE: 122 MMHG | DIASTOLIC BLOOD PRESSURE: 80 MMHG | HEART RATE: 84 BPM | WEIGHT: 140 LBS | OXYGEN SATURATION: 96 % | HEIGHT: 67 IN

## 2024-08-15 DIAGNOSIS — G47.33 OBSTRUCTIVE SLEEP APNEA SYNDROME: ICD-10-CM

## 2024-08-15 DIAGNOSIS — F32.1 CURRENT MODERATE EPISODE OF MAJOR DEPRESSIVE DISORDER WITHOUT PRIOR EPISODE: Primary | ICD-10-CM

## 2024-08-15 DIAGNOSIS — I10 ESSENTIAL (PRIMARY) HYPERTENSION: ICD-10-CM

## 2024-08-15 DIAGNOSIS — F51.01 PRIMARY INSOMNIA: ICD-10-CM

## 2024-08-15 RX ORDER — DIAZEPAM 5 MG/1
5 TABLET ORAL 2 TIMES DAILY
COMMUNITY
Start: 2024-05-30

## 2024-08-15 RX ORDER — FLUOXETINE HYDROCHLORIDE 20 MG/1
20 CAPSULE ORAL DAILY
Qty: 30 CAPSULE | Refills: 1 | Status: SHIPPED | OUTPATIENT
Start: 2024-08-15 | End: 2024-10-14

## 2024-08-15 RX ORDER — AMITRIPTYLINE HYDROCHLORIDE 25 MG/1
25 TABLET, FILM COATED ORAL NIGHTLY PRN
Qty: 14 TABLET | Refills: 0 | Status: SHIPPED | OUTPATIENT
Start: 2024-08-15 | End: 2024-08-29

## 2024-08-15 RX ORDER — LISINOPRIL AND HYDROCHLOROTHIAZIDE 10; 12.5 MG/1; MG/1
1 TABLET ORAL DAILY
Qty: 90 TABLET | Refills: 3 | Status: SHIPPED | OUTPATIENT
Start: 2024-08-15 | End: 2025-08-10

## 2024-08-15 NOTE — Clinical Note
[facility] Thank you.

## 2024-09-11 NOTE — PROGRESS NOTES
"Follow-up       HPI:    Patient presents for one-month recheck of depression.  She reported depression and anxiety.  She was having issues falling asleep.  We started on Prozac 20 mg daily.  We also started on Elavil 25 mg in the evening for sleep.   She has been out of her medications for 1/2 week.  She also requested to do a home sleep study secondary to non restorative sleep and excessive daytime sleepiness with snoring.    Her depression has been much improved  Her anxiety has been better.  She has been sleeping well  Her appetite has been good      Current Outpatient Medications   Medication Instructions    amitriptyline (ELAVIL) 25 mg, Oral, Nightly PRN    diazePAM (VALIUM) 5 mg, 2 times daily    FLUoxetine 20 mg, Oral, Daily    lisinopriL-hydrochlorothiazide (PRINZIDE,ZESTORETIC) 10-12.5 mg per tablet 1 tablet, Oral, Daily    multivitamin with minerals tablet 1 tablet, Oral, Daily    simvastatin (ZOCOR) 40 mg, Oral, Nightly         ROS:    Pt did home sleep evaluation; diagnosed with mild BARRETT.  AHI: 6.9.  She is awaiting machine.      PE:    ..Visit Vitals  /70   Pulse 87   Temp 97.5 °F (36.4 °C)   Ht 5' 7" (1.702 m)   Wt 62.6 kg (138 lb)   SpO2 95%   BMI 21.61 kg/m²        General: She is a well-developed well-nourished white female in no apparent distress.  She is alert and oriented.  She has a good affect.  She interacts well.        1. Current moderate episode of major depressive disorder without prior episode  Overview:  08/15/2024:   Patient reports sadness and depression for the past few months.  She also has anxiety and worries a lot.  She has feelings of helplessness and hopelessness.  She reports sleep issues.  Start Prozac 20 mg daily; patient advised it may take 10-14 days to notice a difference and 4-6 weeks for full effect of medication.  Patient advised to contact me should symptoms worsen or new symptoms develop.    Follow-up office visit in one-month.    09/17/2024: Patient states her " depression is much improved on medication.  She denies any sadness, depression, crying spells or suicidal thoughts.    Continue fluoxetine; refills sent.      2. Primary insomnia  Overview:  Stable without meds.     08/15/2024:   Patient reports problems with falling asleep.    Trial of Elavil 25 mg in the evening.  Patient advised to call with update.    09/17/2024:  Patient is much better with Elavil; refills sent.       3. Obstructive sleep apnea syndrome  Overview:  08/15/2024:   Patient reports snoring, non restorative sleep and excessive daytime sleepiness.    Due to reported snoring as well as non restorative sleep and excessive daytime sleepiness with hypertension and mood disorder, it would be medically prudent to do a home sleep evaluation.    09/17/2024:  Patient did home sleep evaluation which revealed mild obstructive sleep apnea.  She is awaiting machine.      Other orders  -     amitriptyline (ELAVIL) 25 MG tablet; Take 1 tablet (25 mg total) by mouth nightly as needed for Insomnia.  Dispense: 30 tablet; Refill: 5  -     FLUoxetine 20 MG capsule; Take 1 capsule (20 mg total) by mouth once daily.  Dispense: 30 capsule; Refill: 5              ..Follow up in about 3 months (around 12/17/2024) for Wellness, With labwork prior to visit.       Future Appointments   Date Time Provider Department Center   12/17/2024  2:00 PM Adal Dempsey MD [facility]

## 2024-09-17 ENCOUNTER — OFFICE VISIT (OUTPATIENT)
Dept: PRIMARY CARE CLINIC | Facility: CLINIC | Age: 64
End: 2024-09-17
Payer: COMMERCIAL

## 2024-09-17 VITALS
TEMPERATURE: 98 F | WEIGHT: 138 LBS | DIASTOLIC BLOOD PRESSURE: 70 MMHG | BODY MASS INDEX: 21.66 KG/M2 | SYSTOLIC BLOOD PRESSURE: 119 MMHG | HEART RATE: 87 BPM | HEIGHT: 67 IN | OXYGEN SATURATION: 95 %

## 2024-09-17 DIAGNOSIS — F32.1 CURRENT MODERATE EPISODE OF MAJOR DEPRESSIVE DISORDER WITHOUT PRIOR EPISODE: Primary | ICD-10-CM

## 2024-09-17 DIAGNOSIS — F51.01 PRIMARY INSOMNIA: ICD-10-CM

## 2024-09-17 DIAGNOSIS — G47.33 OBSTRUCTIVE SLEEP APNEA SYNDROME: ICD-10-CM

## 2024-09-17 PROCEDURE — 3074F SYST BP LT 130 MM HG: CPT | Mod: CPTII,,, | Performed by: FAMILY MEDICINE

## 2024-09-17 PROCEDURE — 4010F ACE/ARB THERAPY RXD/TAKEN: CPT | Mod: CPTII,,, | Performed by: FAMILY MEDICINE

## 2024-09-17 PROCEDURE — 1160F RVW MEDS BY RX/DR IN RCRD: CPT | Mod: CPTII,,, | Performed by: FAMILY MEDICINE

## 2024-09-17 PROCEDURE — 1159F MED LIST DOCD IN RCRD: CPT | Mod: CPTII,,, | Performed by: FAMILY MEDICINE

## 2024-09-17 PROCEDURE — 3008F BODY MASS INDEX DOCD: CPT | Mod: CPTII,,, | Performed by: FAMILY MEDICINE

## 2024-09-17 PROCEDURE — 3078F DIAST BP <80 MM HG: CPT | Mod: CPTII,,, | Performed by: FAMILY MEDICINE

## 2024-09-17 PROCEDURE — 99213 OFFICE O/P EST LOW 20 MIN: CPT | Mod: ,,, | Performed by: FAMILY MEDICINE

## 2024-09-17 RX ORDER — AMITRIPTYLINE HYDROCHLORIDE 25 MG/1
25 TABLET, FILM COATED ORAL NIGHTLY PRN
Qty: 30 TABLET | Refills: 5 | Status: SHIPPED | OUTPATIENT
Start: 2024-09-17 | End: 2025-03-16

## 2024-09-17 RX ORDER — FLUOXETINE HYDROCHLORIDE 20 MG/1
20 CAPSULE ORAL DAILY
Qty: 30 CAPSULE | Refills: 5 | Status: SHIPPED | OUTPATIENT
Start: 2024-09-17 | End: 2025-03-16

## 2024-12-13 DIAGNOSIS — Z00.00 WELLNESS EXAMINATION: Primary | ICD-10-CM

## 2024-12-15 PROBLEM — Z12.11 COLON CANCER SCREENING: Status: ACTIVE | Noted: 2024-12-15

## 2024-12-16 NOTE — PROGRESS NOTES
..Annual Exam       HISTORY OF PRESENT ILLNESS    This 64 y.o. female patient presents to the clinic today for a wellness CPX.  She has been feeling well.  She has been sleeping well. It takes a while to fall asleep.   Her appetite is good; she has gained 12 # since September.   She is . No children.   She works in housekeeping.    She is active; she does yard work.  She has an occasional glass of wine.   Tobacco: 1/2 pack per day. She has been smoking since she was a teenager.   She has 2 cups coffee and 1 soda a day.  Colonoscopy Dr. Nair:  September/October 2024.   Gyn: Dr. Irby; last office visit 05/08/2024; history of hysterectomy.  Last mammogram 10/2024.      The patient's Health Maintenance was reviewed and the following appears to be due at this time:   Health Maintenance Due   Topic Date Due    Hepatitis C Screening  Never done    HIV Screening  Never done    Shingles Vaccine (1 of 2) Never done    RSV Vaccine (Age 60+ and Pregnant patients) (1 - Risk 60-74 years 1-dose series) Never done    Influenza Vaccine (1) 09/01/2024    COVID-19 Vaccine (4 - 2024-25 season) 09/01/2024    Mammogram  10/24/2024       ..  Past Medical History:   Diagnosis Date    Cough     Essential (primary) hypertension     Fatigue     Hypercholesterolemia     Low back pain           ..  Past Surgical History:   Procedure Laterality Date    APPENDECTOMY      BREAST LUMPECTOMY      COLONOSCOPY  03/01/2019    Extracapsular cataract removal with insertion of intraocular lens prosthesis Left 07/10/2015    Extracapsular cataract removal with insertion of intraocular lens prosthesis  Right 06/30/2015    HYSTERECTOMY  09/2011          Current Outpatient Medications   Medication Instructions    amitriptyline (ELAVIL) 50 mg, Oral, Nightly    FLUoxetine 20 mg, Oral, Daily    lisinopriL-hydrochlorothiazide (PRINZIDE,ZESTORETIC) 10-12.5 mg per tablet 1 tablet, Oral, Daily    multivitamin with minerals tablet 1 tablet, Daily     phentermine (ADIPEX-P) 37.5 mg, Oral, Before breakfast    simvastatin (ZOCOR) 40 mg, Oral, Nightly         ..  Social History     Socioeconomic History    Marital status:     Number of children: 0   Occupational History    Occupation: Cleans Houses   Tobacco Use    Smoking status: Every Day     Types: Cigarettes    Smokeless tobacco: Never    Tobacco comments:     Age Started: 16   Substance and Sexual Activity    Alcohol use: Yes     Comment: 1-2 times a month    Drug use: Never    Sexual activity: Not Currently     Partners: Male     Social Drivers of Health     Financial Resource Strain: Low Risk  (12/17/2024)    Overall Financial Resource Strain (CARDIA)     Difficulty of Paying Living Expenses: Not hard at all   Food Insecurity: No Food Insecurity (12/17/2024)    Hunger Vital Sign     Worried About Running Out of Food in the Last Year: Never true     Ran Out of Food in the Last Year: Never true   Transportation Needs: No Transportation Needs (12/17/2024)    TRANSPORTATION NEEDS     Transportation : No   Physical Activity: Sufficiently Active (12/17/2024)    Exercise Vital Sign     Days of Exercise per Week: 5 days     Minutes of Exercise per Session: 60 min   Stress: No Stress Concern Present (12/17/2024)    [employer] - Occupational Stress Questionnaire     Feeling of Stress : Not at all   Housing Stability: Low Risk  (12/17/2024)    Housing Stability Vital Sign     Unable to Pay for Housing in the Last Year: No     Homeless in the Last Year: No          ..  Family History   Problem Relation Name Age of Onset    Cancer Mother      Heart disease Father            ..Review of patient's allergies indicates:  No Known Allergies       ..  Immunization History   Administered Date(s) Administered    COVID-19, MRNA, LN-S, PF (Pfizer) (Purple Cap) 03/26/2021, 04/16/2021, 12/29/2021    Influenza - Trivalent - Fluarix, Flulaval, Fluzone, Afluria - PF 08/21/2014    Pneumococcal  "Conjugate - 13 Valent 05/31/2016    Td (ADULT) 05/26/2016          REVIEW OF SYSTEMS:    GENERAL:   no unexplained wt gain/loss,  fever, + fatigue, chills, night sweats or weakness  HEENT: no sore throat, ear pain, sinus pressure, + nasal congestion, or rhinorrhea  VISION: no vision changes, glaucoma, history of bilateral cataract surgery, + reading glasses  CARDIAC: no chest pain, palpitations, dyspnea on exertion, orthopnea  RESPIRATORY: no cough, wheezing, sputum production, or SOB  GI: no abdominal pain, n&v, no heartburn, constipation, diarrhea,  blood in stool or  (--)family history of colon cancer    : no dysuria, hematuria, frequency,  urgency, incontinence,  vaginal discharge,  abnormal vaginal bleeding  MUSC/SKEL:  no myalgia, weakness, edema,  arthralgia, or joint effusion  SKIN:  No rash, hives, itching or sores  NEURO:  No headaches, numbness,  tingling, weakness, or dizziness  PSYCH:  No anxiety,  depression,  irritability,  suicidal ideation or hallucinations  ENDO:  No polyuria, polydipsia,  polyphagia  HEME:  No bruising,  lymphadenopathy, bleeding disorders, no anemia       PHYSICAL EXAM:    Visit Vitals  /72   Pulse 90   Temp 98 °F (36.7 °C)   Ht 5' 7" (1.702 m)   Wt 68 kg (150 lb)   SpO2 96%   BMI 23.49 kg/m²       GENERAL:  Well-developed well-nourished white female in NAD, alert and oriented x 3  SKIN:  no rash or abnormal appearing skin lesions  HEENT:  PERRLA, EOMI, mouth wnl, throat wnl, EAC and TM wnl bilaterally  NECK:  FROM, no lymphadenopathy, no thyroid abnormalities palpable  CHEST:  CTA bilaterally no wheezes, crackles or rubs  CARDIAC:  RRR, no murmurs audible  ABDOMEN:  Soft, nontender, nondistended, NBSx4, no rebound or guarding, no HSM  EXTREMITIES:  no clubbing, cyanosis, or edema.  joints wnl. +2 DP/PT pulse bilaterally  NEURO:  no sensory or motor deficits noted. CN II-XII intact. Gait wnl.           ASSESSMENT AND PLAN     1. Wellness examination  Overview:  CBC, CMP, " Lipids, UA, TSH ordered today.   Colonoscopy Dr. Nair:  03/2019; repeat in 5 years.    Gyn: Dr. Mahamed FERGUSON. +hyst.  MMG recently; [facility]---requesting.  Declines all vaccines.  Declines low dose CT chest.  Encourage pt to increase cardiovascular exercise and attempt to obtain at least 150 minutes of moderate aerobic exercise per week or 75 minutes of vigorous aerobic exercise weekly.   Weight bearing activities encouraged to increase bone density.         Assessment & Plan:  Patient presents for wellness examination.    She has been feeling well.    Patient has problems with falling asleep; increase Elavil to 50 mg daily.    She has gained 12 lb; she desires short-term medication for weight loss.    Patient encouraged to make healthy food choices and limit portions.  Prescription for phentermine sent; benefits, potential risks and side effects discussed with patient.  Patient encouraged to remain physically active.  Patient continues to smoke 1/2 pack per day; patient encouraged to decrease/stop usage.    Colonoscopy: Dr Nair; September/October 2024.  Obtain report.    Gyn: Dr. Irby; last office visit 05/08/2024; history of hysterectomy.  Last mammogram 10/2024.  Patient declines all vaccinations.    Labs pending; order in chart.      2. Essential (primary) hypertension  Overview:  Stable and well controlled at this time. Continue current treatment. Limit salt in diet. Monitor BP at home.     08/15/2024:   Her blood pressure is well controlled; continue lisinopril HCT.  Refills sent.      Assessment & Plan:  Her blood pressure is well controlled; continue lisinopril HCT. Refills sent.     Orders:  -     lisinopriL-hydrochlorothiazide (PRINZIDE,ZESTORETIC) 10-12.5 mg per tablet; Take 1 tablet by mouth once daily.  Dispense: 90 tablet; Refill: 3    3. Hypercholesterolemia  Overview:  Continue current medicine. Follow low-cholesterol diet with physical activity.       Assessment & Plan:  Patient has been compliant  with simvastatin; refills sent.    Continue low-cholesterol/low-fat diet.    Lipid profile pending.    Orders:  -     simvastatin (ZOCOR) 40 MG tablet; Take 1 tablet (40 mg total) by mouth every evening.  Dispense: 90 tablet; Refill: 3    4. Obstructive sleep apnea syndrome  Overview:  08/15/2024:   Patient reports snoring, non restorative sleep and excessive daytime sleepiness.    Due to reported snoring as well as non restorative sleep and excessive daytime sleepiness with hypertension and mood disorder, it would be medically prudent to do a home sleep evaluation.    09/17/2024:  Patient did home sleep evaluation which revealed mild obstructive sleep apnea.  She is awaiting machine.    Assessment & Plan:  Patient uses machine for 4-5 hours a night; patient encouraged machine for duration of night.      5. Recurrent major depressive disorder, in full remission  Overview:  08/15/2024:   Patient reports sadness and depression for the past few months.  She also has anxiety and worries a lot.  She has feelings of helplessness and hopelessness.  She reports sleep issues.  Start Prozac 20 mg daily; patient advised it may take 10-14 days to notice a difference and 4-6 weeks for full effect of medication.  Patient advised to contact me should symptoms worsen or new symptoms develop.    Follow-up office visit in one-month.    09/17/2024: Patient states her depression is much improved on medication.  She denies any sadness, depression, crying spells or suicidal thoughts.    Continue fluoxetine; refills sent.    Assessment & Plan:  Patient states her depression continues to do well; she denies any sadness, depression, crying spells or suicidal thoughts; ER precautions given.      6. Primary insomnia  Overview:  Stable without meds.     08/15/2024:   Patient reports problems with falling asleep.    Trial of Elavil 25 mg in the evening.  Patient advised to call with update.    09/17/2024:  Patient is much better with Elavil;  refills sent.     Assessment & Plan:  Patient has been sleeping better but it takes her a while to fall asleep.    Increase amitriptyline to 50 mg in the evening.    Patient advised to call with update.      7. Tobacco use  Overview:  Encouraged cessation.  Declines low dose CT chest screenings.     Assessment & Plan:  Patient continues to smoke 1/2 pack per day; patient encouraged to decrease/stop usage.  Four minutes spent discussing cessation.  Patient declines low-dose CT scan; benefits and risks reviewed with patient.      8. Vertigo  Overview:  Condition and treatment options discussed with patient.    Start meclizine 25 mg:  Take 1 tab every 6 hours for dizziness.   Patient information sheet for Epley maneuver given.  Change positions slowly and deliberately.  She is to call if symptoms persist or worsen.    Assessment & Plan:  Patient denies any recent issues with vertigo.      9. Overweight  Overview:  Patient has gained 12 lb.    She desires to take medication for short period of time to lose weight.    Start phentermine 37.5 mg: Take 1/2 tablet to assess tolerance.  If tolerating okay, may increase to 1 tablet daily.  Make healthy food choices and limit portions.  Encouraged patient to remain physically active.    Orders:  -     phentermine (ADIPEX-P) 37.5 mg tablet; Take 1 tablet (37.5 mg total) by mouth before breakfast.  Dispense: 30 tablet; Refill: 1    Other orders  -     amitriptyline (ELAVIL) 50 MG tablet; Take 1 tablet (50 mg total) by mouth every evening.  Dispense: 30 tablet; Refill: 1  -     FLUoxetine 20 MG capsule; Take 1 capsule (20 mg total) by mouth once daily.  Dispense: 90 capsule; Refill: 3         ..Follow up in about 6 months (around 6/17/2025) for Follow Up.     Future Appointments   Date Time Provider Department Center   6/18/2025  2:30 PM [facility]

## 2024-12-17 ENCOUNTER — OFFICE VISIT (OUTPATIENT)
Dept: PRIMARY CARE CLINIC | Facility: CLINIC | Age: 64
End: 2024-12-17
Payer: COMMERCIAL

## 2024-12-17 VITALS
WEIGHT: 150 LBS | BODY MASS INDEX: 23.54 KG/M2 | SYSTOLIC BLOOD PRESSURE: 118 MMHG | HEIGHT: 67 IN | HEART RATE: 90 BPM | TEMPERATURE: 98 F | OXYGEN SATURATION: 96 % | DIASTOLIC BLOOD PRESSURE: 72 MMHG

## 2024-12-17 DIAGNOSIS — Z00.00 WELLNESS EXAMINATION: Primary | ICD-10-CM

## 2024-12-17 DIAGNOSIS — R42 VERTIGO: ICD-10-CM

## 2024-12-17 DIAGNOSIS — G47.33 OBSTRUCTIVE SLEEP APNEA SYNDROME: ICD-10-CM

## 2024-12-17 DIAGNOSIS — F51.01 PRIMARY INSOMNIA: ICD-10-CM

## 2024-12-17 DIAGNOSIS — E66.3 OVERWEIGHT: ICD-10-CM

## 2024-12-17 DIAGNOSIS — E78.00 HYPERCHOLESTEROLEMIA: ICD-10-CM

## 2024-12-17 DIAGNOSIS — F33.42 RECURRENT MAJOR DEPRESSIVE DISORDER, IN FULL REMISSION: ICD-10-CM

## 2024-12-17 DIAGNOSIS — I10 ESSENTIAL (PRIMARY) HYPERTENSION: ICD-10-CM

## 2024-12-17 DIAGNOSIS — Z72.0 TOBACCO USE: ICD-10-CM

## 2024-12-17 PROBLEM — F32.5 MAJOR DEPRESSION IN COMPLETE REMISSION: Status: ACTIVE | Noted: 2022-06-20

## 2024-12-17 PROCEDURE — 99396 PREV VISIT EST AGE 40-64: CPT | Mod: 25,,, | Performed by: FAMILY MEDICINE

## 2024-12-17 PROCEDURE — 4010F ACE/ARB THERAPY RXD/TAKEN: CPT | Mod: CPTII,,, | Performed by: FAMILY MEDICINE

## 2024-12-17 PROCEDURE — 3008F BODY MASS INDEX DOCD: CPT | Mod: CPTII,,, | Performed by: FAMILY MEDICINE

## 2024-12-17 PROCEDURE — 1160F RVW MEDS BY RX/DR IN RCRD: CPT | Mod: CPTII,,, | Performed by: FAMILY MEDICINE

## 2024-12-17 PROCEDURE — 3078F DIAST BP <80 MM HG: CPT | Mod: CPTII,,, | Performed by: FAMILY MEDICINE

## 2024-12-17 PROCEDURE — 99406 BEHAV CHNG SMOKING 3-10 MIN: CPT | Mod: ,,, | Performed by: FAMILY MEDICINE

## 2024-12-17 PROCEDURE — 1159F MED LIST DOCD IN RCRD: CPT | Mod: CPTII,,, | Performed by: FAMILY MEDICINE

## 2024-12-17 PROCEDURE — 3074F SYST BP LT 130 MM HG: CPT | Mod: CPTII,,, | Performed by: FAMILY MEDICINE

## 2024-12-17 RX ORDER — AMITRIPTYLINE HYDROCHLORIDE 50 MG/1
50 TABLET, FILM COATED ORAL NIGHTLY
Qty: 30 TABLET | Refills: 1 | Status: SHIPPED | OUTPATIENT
Start: 2024-12-17 | End: 2025-02-15

## 2024-12-17 RX ORDER — SIMVASTATIN 40 MG/1
40 TABLET, FILM COATED ORAL NIGHTLY
Qty: 90 TABLET | Refills: 3 | Status: SHIPPED | OUTPATIENT
Start: 2024-12-17

## 2024-12-17 RX ORDER — LISINOPRIL AND HYDROCHLOROTHIAZIDE 10; 12.5 MG/1; MG/1
1 TABLET ORAL DAILY
Qty: 90 TABLET | Refills: 3 | Status: SHIPPED | OUTPATIENT
Start: 2024-12-17 | End: 2025-12-12

## 2024-12-17 RX ORDER — FLUOXETINE HYDROCHLORIDE 20 MG/1
20 CAPSULE ORAL DAILY
Qty: 90 CAPSULE | Refills: 3 | Status: SHIPPED | OUTPATIENT
Start: 2024-12-17 | End: 2025-12-12

## 2024-12-17 RX ORDER — PHENTERMINE HYDROCHLORIDE 37.5 MG/1
37.5 TABLET ORAL
Qty: 30 TABLET | Refills: 1 | Status: SHIPPED | OUTPATIENT
Start: 2024-12-17 | End: 2025-02-15

## 2024-12-17 NOTE — ASSESSMENT & PLAN NOTE
Patient states her depression continues to do well; she denies any sadness, depression, crying spells or suicidal thoughts; ER precautions given.

## 2024-12-17 NOTE — ASSESSMENT & PLAN NOTE
Patient has been sleeping better but it takes her a while to fall asleep.    Increase amitriptyline to 50 mg in the evening.    Patient advised to call with update.

## 2024-12-17 NOTE — ASSESSMENT & PLAN NOTE
Patient has been compliant with simvastatin; refills sent.    Continue low-cholesterol/low-fat diet.    Lipid profile pending.

## 2024-12-17 NOTE — ASSESSMENT & PLAN NOTE
Patient presents for wellness examination.    She has been feeling well.    Patient has problems with falling asleep; increase Elavil to 50 mg daily.    She has gained 12 lb; she desires short-term medication for weight loss.    Patient encouraged to make healthy food choices and limit portions.  Prescription for phentermine sent; benefits, potential risks and side effects discussed with patient.  Patient encouraged to remain physically active.  Patient continues to smoke 1/2 pack per day; patient encouraged to decrease/stop usage.    Colonoscopy: Dr Nair; September/October 2024.  Obtain report.    Gyn: Dr. Irby; last office visit 05/08/2024; history of hysterectomy.  Last mammogram 10/2024.  Patient declines all vaccinations.    Labs pending; order in chart.

## 2024-12-17 NOTE — ASSESSMENT & PLAN NOTE
Patient continues to smoke 1/2 pack per day; patient encouraged to decrease/stop usage.  Four minutes spent discussing cessation.  Patient declines low-dose CT scan; benefits and risks reviewed with patient.

## 2025-02-03 DIAGNOSIS — I10 ESSENTIAL (PRIMARY) HYPERTENSION: Primary | ICD-10-CM

## 2025-02-03 DIAGNOSIS — F51.01 PRIMARY INSOMNIA: ICD-10-CM

## 2025-02-03 RX ORDER — AMITRIPTYLINE HYDROCHLORIDE 25 MG/1
25 TABLET, FILM COATED ORAL NIGHTLY
Qty: 30 TABLET | Refills: 0 | Status: SHIPPED | OUTPATIENT
Start: 2025-02-03 | End: 2025-03-03

## 2025-03-02 DIAGNOSIS — F51.01 PRIMARY INSOMNIA: ICD-10-CM

## 2025-03-03 RX ORDER — AMITRIPTYLINE HYDROCHLORIDE 25 MG/1
25 TABLET, FILM COATED ORAL NIGHTLY
Qty: 30 TABLET | Refills: 0 | Status: SHIPPED | OUTPATIENT
Start: 2025-03-03

## 2025-04-01 DIAGNOSIS — F51.01 PRIMARY INSOMNIA: ICD-10-CM

## 2025-04-01 RX ORDER — AMITRIPTYLINE HYDROCHLORIDE 25 MG/1
25 TABLET, FILM COATED ORAL NIGHTLY
Qty: 30 TABLET | Refills: 0 | Status: SHIPPED | OUTPATIENT
Start: 2025-04-01

## 2025-05-01 DIAGNOSIS — F51.01 PRIMARY INSOMNIA: ICD-10-CM

## 2025-05-01 RX ORDER — AMITRIPTYLINE HYDROCHLORIDE 25 MG/1
25 TABLET, FILM COATED ORAL NIGHTLY
Qty: 30 TABLET | Refills: 0 | Status: SHIPPED | OUTPATIENT
Start: 2025-05-01

## 2025-05-29 DIAGNOSIS — F51.01 PRIMARY INSOMNIA: ICD-10-CM

## 2025-05-29 RX ORDER — AMITRIPTYLINE HYDROCHLORIDE 25 MG/1
25 TABLET, FILM COATED ORAL NIGHTLY
Qty: 30 TABLET | Refills: 0 | Status: SHIPPED | OUTPATIENT
Start: 2025-05-29

## 2025-06-12 PROBLEM — F33.42 RECURRENT MAJOR DEPRESSIVE DISORDER, IN FULL REMISSION: Status: ACTIVE | Noted: 2022-06-20

## 2025-06-25 DIAGNOSIS — F51.01 PRIMARY INSOMNIA: ICD-10-CM

## 2025-06-27 DIAGNOSIS — F51.01 PRIMARY INSOMNIA: ICD-10-CM

## 2025-06-27 RX ORDER — AMITRIPTYLINE HYDROCHLORIDE 25 MG/1
25 TABLET, FILM COATED ORAL NIGHTLY
Qty: 30 TABLET | Refills: 3 | Status: SHIPPED | OUTPATIENT
Start: 2025-06-27

## 2025-06-30 RX ORDER — AMITRIPTYLINE HYDROCHLORIDE 25 MG/1
25 TABLET, FILM COATED ORAL NIGHTLY
Qty: 30 TABLET | Refills: 0 | Status: SHIPPED | OUTPATIENT
Start: 2025-06-30

## 2025-07-30 NOTE — PROGRESS NOTES
"Dizziness and Fall (X 2 weeks)       HPI:    Patient presents for syncopal episodes.  Patient presented to emergency department on 08/03/2025 with abdominal pain and bloody stool intermittently for the past 2 weeks.  Patient states she had a syncopal episode 2 weeks prior to presentation where she fell off the toilet and hit her head and possibly broke her left hand.  She was not evaluated for symptoms at that time.  Patient reported she was having abdominal pain bloody stool today with near syncope.   CT of abdomen and pelvis with IV contrast revealed nonspecific long 7 colitis of descending and sigmoid colon.  No perforation or abscess.  CT of head without contrast revealed a small area of cortical and subcortical hypoattenuation left parietal lobe new compared to prior.  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  No acute intracranial hemorrhage.  X-ray of left hand revealed fracture of the base of the middle phalanx 4th finger.  Patient apparently left emergency department.    Pt still with abdominal pain. No bowel movements since Saturday.      Current Outpatient Medications   Medication Instructions    amitriptyline (ELAVIL) 25 mg, Oral, Nightly    amitriptyline (ELAVIL) 25 mg, Oral, Nightly    ciprofloxacin HCl (CIPRO) 500 mg, Oral, 2 times daily    FLUoxetine 20 mg, Oral, Daily    HYDROcodone-acetaminophen (NORCO) 7.5-325 mg per tablet 1 tablet, Oral, Every 6 hours PRN    lisinopriL-hydrochlorothiazide (PRINZIDE,ZESTORETIC) 10-12.5 mg per tablet 1 tablet, Oral, Daily    meclizine (ANTIVERT) 25 mg, Oral, Every 6 hours    metroNIDAZOLE (FLAGYL) 500 mg, Oral, Every 12 hours    multivitamin with minerals tablet 1 tablet, Daily    simvastatin (ZOCOR) 40 mg, Oral, Nightly         ROS:      PE:    ..Visit Vitals  /75   Pulse 94   Temp 98.5 °F (36.9 °C)   Ht 5' 7" (1.702 m)   Wt 72.1 kg (159 lb)   SpO2 97%   BMI 24.90 kg/m²        General: She is a well-developed well-nourished white female in no " apparent distress.  She is alert and oriented.    Neurologic: Head:  Residual bruising noted just superior and lateral to right eye over temporal region.  Mildly tender to palpation over this area.  No palpable abnormalities  Eyes: PERRLA, EOMI.  Cranial nerves 2-12 grossly intact.  Cerebellar function intact.  No motor or sensory deficits.  Chest: Clear to auscultation bilaterally.    CV: Regular rate rhythm without murmurs rubs or gallops.  Abdomen: Soft, no masses, normoactive bowel sounds, moderate tenderness to palpation over lower abdomen especially mid and left lower abdominal regions.  No guarding or rebound.  Left hand:  Mild tenderness to palpation over mid phalanx of left ring finger, no swelling or palpable abnormality.      1. Colitis  Overview:  08/06/2025: Patient with abdominal pain and diarrhea x2 weeks.  Patient had initial episode 2 weeks ago and then symptoms improved.  She had a recurrence of symptoms this past weekend prompting her to seek emergency attention on 08/03/2025.  CT of abdomen and pelvis revealed a long segment colitis of descending in sigmoid colon.  ER wanted to admit patient but patient declined resulting her not getting any prescriptions for her colitis.  Start Cipro 500 mg twice daily x7 days.    Start Flagyl 500 mg twice daily x7 days.  [place] diet.  Start Norco 7.5 mg: Take 1 tablet every 6 hours as needed for pain.  Patient advised to call with update in 2 days.    Follow-up office visit in 1 week.    ER precautions given.    Orders:  -     HYDROcodone-acetaminophen (NORCO) 7.5-325 mg per tablet; Take 1 tablet by mouth every 6 (six) hours as needed for Pain.  Dispense: 15 tablet; Refill: 0    2. Vertigo  Overview:  Condition and treatment options discussed with patient.    Start meclizine 25 mg:  Take 1 tab every 6 hours for dizziness.   Patient information sheet for Epley maneuver given.  Change positions slowly and deliberately.  She is to call if symptoms persist or  worsen.    Assessment & Plan:  Patient reports episodes of vertigo recently.    Start meclizine 25 mg: Take 1 every 6 hours for vertigo.  Change positions slowly and deliberately.    Patient is to let me know if symptoms persist, worsen or new symptoms develop.      3. Vasovagal syncope  Overview:  08/06/2025:  Review of hospital records and HPI reveals patient had a syncopal episode 2 weeks prior to ER presentation.  Patient was on toilet awhile when this occurred.  She is having diarrhea and nausea.  Symptoms consistent with vasovagal episode.  Patient had similar symptoms this past Saturday also awhile tolerating.  She had a presyncopal episode which appears similar to her episode that occurred 2 weeks ago.        4. Abnormal CT of brain  Overview:  CT of head without contrast revealed a small area of cortical and subcortical hypoattenuation left parietal lobe new compared to prior CT.  Possible developed a small infarcts since 2021.  No acute intracranial hemorrhage.  Could be secondary to initial fall 2 weeks prior to presentation.  Could represent contusion or previous small hemorrhage.  We will follow-up.  Consider referral to Neurology.      5. Nondisplaced fracture of middle phalanx of left ring finger, initial encounter for closed fracture  Overview:  08/06/2025:  Patient with fracture encouraged after 1st fall.  Patient declines to wear a finger splint.  Patient states finger not overly bothersome.      Other orders  -     ciprofloxacin HCl (CIPRO) 500 MG tablet; Take 1 tablet (500 mg total) by mouth 2 (two) times daily. for 7 days  Dispense: 14 tablet; Refill: 0  -     metroNIDAZOLE (FLAGYL) 500 MG tablet; Take 1 tablet (500 mg total) by mouth every 12 (twelve) hours. for 7 days  Dispense: 14 tablet; Refill: 0  -     meclizine (ANTIVERT) 25 mg tablet; Take 1 tablet (25 mg total) by mouth every 6 (six) hours.  Dispense: 20 tablet; Refill: 1              ..Follow up in about 1 week (around 8/13/2025), or  if symptoms worsen or fail to improve, for Follow Up.       No future appointments.

## 2025-08-03 ENCOUNTER — HOSPITAL ENCOUNTER (EMERGENCY)
Facility: HOSPITAL | Age: 65
Discharge: HOME | End: 2025-08-03
Attending: EMERGENCY MEDICINE
Payer: MEDICARE

## 2025-08-03 VITALS
HEART RATE: 86 BPM | TEMPERATURE: 98 F | WEIGHT: 150 LBS | RESPIRATION RATE: 18 BRPM | OXYGEN SATURATION: 98 % | BODY MASS INDEX: 23.54 KG/M2 | HEIGHT: 67 IN | SYSTOLIC BLOOD PRESSURE: 166 MMHG | DIASTOLIC BLOOD PRESSURE: 86 MMHG

## 2025-08-03 DIAGNOSIS — R55 SYNCOPE, UNSPECIFIED SYNCOPE TYPE: ICD-10-CM

## 2025-08-03 DIAGNOSIS — R42 DIZZINESS: ICD-10-CM

## 2025-08-03 DIAGNOSIS — R10.9 ABDOMINAL PAIN, UNSPECIFIED ABDOMINAL LOCATION: Primary | ICD-10-CM

## 2025-08-03 DIAGNOSIS — K52.9 COLITIS: ICD-10-CM

## 2025-08-03 DIAGNOSIS — S62.645A CLOSED NONDISPLACED FRACTURE OF PROXIMAL PHALANX OF LEFT RING FINGER, INITIAL ENCOUNTER: ICD-10-CM

## 2025-08-03 LAB
ALBUMIN SERPL-MCNC: 4.2 G/DL (ref 3.4–4.8)
ALBUMIN/GLOB SERPL: 1.1 RATIO (ref 1.1–2)
ALP SERPL-CCNC: 94 UNIT/L (ref 40–150)
ALT SERPL-CCNC: 13 UNIT/L (ref 0–55)
ANION GAP SERPL CALC-SCNC: 8 MEQ/L
AST SERPL-CCNC: 21 UNIT/L (ref 11–45)
BACTERIA #/AREA URNS AUTO: ABNORMAL /HPF
BASOPHILS # BLD AUTO: 0.05 X10(3)/MCL
BASOPHILS NFR BLD AUTO: 0.3 %
BILIRUB SERPL-MCNC: 0.4 MG/DL
BILIRUB UR QL STRIP.AUTO: NEGATIVE
BUN SERPL-MCNC: 19.1 MG/DL (ref 9.8–20.1)
CALCIUM SERPL-MCNC: 9.6 MG/DL (ref 8.4–10.2)
CHLORIDE SERPL-SCNC: 101 MMOL/L (ref 98–107)
CLARITY UR: CLEAR
CO2 SERPL-SCNC: 27 MMOL/L (ref 23–31)
COLOR UR AUTO: YELLOW
CREAT SERPL-MCNC: 0.75 MG/DL (ref 0.55–1.02)
CREAT/UREA NIT SERPL: 25
EOSINOPHIL # BLD AUTO: 0.08 X10(3)/MCL (ref 0–0.9)
EOSINOPHIL NFR BLD AUTO: 0.5 %
ERYTHROCYTE [DISTWIDTH] IN BLOOD BY AUTOMATED COUNT: 12.9 % (ref 11.5–17)
GFR SERPLBLD CREATININE-BSD FMLA CKD-EPI: >60 ML/MIN/1.73/M2
GLOBULIN SER-MCNC: 3.8 GM/DL (ref 2.4–3.5)
GLUCOSE SERPL-MCNC: 122 MG/DL (ref 82–115)
GLUCOSE UR QL STRIP: NORMAL
HCT VFR BLD AUTO: 43.8 % (ref 37–47)
HGB BLD-MCNC: 14.4 G/DL (ref 12–16)
HGB UR QL STRIP: ABNORMAL
HYALINE CASTS #/AREA URNS LPF: ABNORMAL /LPF
IMM GRANULOCYTES # BLD AUTO: 0.07 X10(3)/MCL (ref 0–0.04)
IMM GRANULOCYTES NFR BLD AUTO: 0.5 %
INR PPP: 0.9
KETONES UR QL STRIP: NEGATIVE
LEUKOCYTE ESTERASE UR QL STRIP: NEGATIVE
LIPASE SERPL-CCNC: 19 U/L
LYMPHOCYTES # BLD AUTO: 1.91 X10(3)/MCL (ref 0.6–4.6)
LYMPHOCYTES NFR BLD AUTO: 13.1 %
MCH RBC QN AUTO: 31.8 PG (ref 27–31)
MCHC RBC AUTO-ENTMCNC: 32.9 G/DL (ref 33–36)
MCV RBC AUTO: 96.7 FL (ref 80–94)
MONOCYTES # BLD AUTO: 1.16 X10(3)/MCL (ref 0.1–1.3)
MONOCYTES NFR BLD AUTO: 7.9 %
MUCOUS THREADS URNS QL MICRO: ABNORMAL /LPF
NEUTROPHILS # BLD AUTO: 11.33 X10(3)/MCL (ref 2.1–9.2)
NEUTROPHILS NFR BLD AUTO: 77.7 %
NITRITE UR QL STRIP: NEGATIVE
NRBC BLD AUTO-RTO: 0 %
OHS QRS DURATION: 84 MS
OHS QTC CALCULATION: 441 MS
PH UR STRIP: 6 [PH]
PLATELET # BLD AUTO: 239 X10(3)/MCL (ref 130–400)
PMV BLD AUTO: 10.3 FL (ref 7.4–10.4)
POTASSIUM SERPL-SCNC: 4 MMOL/L (ref 3.5–5.1)
PROT SERPL-MCNC: 8 GM/DL (ref 5.8–7.6)
PROT UR QL STRIP: NEGATIVE
PROTHROMBIN TIME: 12.7 SECONDS (ref 12.5–14.5)
RBC # BLD AUTO: 4.53 X10(6)/MCL (ref 4.2–5.4)
RBC #/AREA URNS AUTO: ABNORMAL /HPF
SODIUM SERPL-SCNC: 136 MMOL/L (ref 136–145)
SP GR UR STRIP.AUTO: 1.02 (ref 1–1.03)
SQUAMOUS #/AREA URNS LPF: ABNORMAL /HPF
TROPONIN I SERPL HS-MCNC: <3 NG/L
UROBILINOGEN UR STRIP-ACNC: NORMAL
WBC # BLD AUTO: 14.6 X10(3)/MCL (ref 4.5–11.5)
WBC #/AREA URNS AUTO: ABNORMAL /HPF

## 2025-08-03 PROCEDURE — 81001 URINALYSIS AUTO W/SCOPE: CPT | Performed by: NURSE PRACTITIONER

## 2025-08-03 PROCEDURE — 99285 EMERGENCY DEPT VISIT HI MDM: CPT | Mod: 25

## 2025-08-03 PROCEDURE — 85610 PROTHROMBIN TIME: CPT | Performed by: NURSE PRACTITIONER

## 2025-08-03 PROCEDURE — 25000003 PHARM REV CODE 250: Performed by: PHYSICIAN ASSISTANT

## 2025-08-03 PROCEDURE — 80053 COMPREHEN METABOLIC PANEL: CPT | Performed by: NURSE PRACTITIONER

## 2025-08-03 PROCEDURE — 93005 ELECTROCARDIOGRAM TRACING: CPT

## 2025-08-03 PROCEDURE — 63600175 PHARM REV CODE 636 W HCPCS: Performed by: PHYSICIAN ASSISTANT

## 2025-08-03 PROCEDURE — 85025 COMPLETE CBC W/AUTO DIFF WBC: CPT | Performed by: NURSE PRACTITIONER

## 2025-08-03 PROCEDURE — 96361 HYDRATE IV INFUSION ADD-ON: CPT

## 2025-08-03 PROCEDURE — 84484 ASSAY OF TROPONIN QUANT: CPT | Performed by: NURSE PRACTITIONER

## 2025-08-03 PROCEDURE — 83690 ASSAY OF LIPASE: CPT | Performed by: NURSE PRACTITIONER

## 2025-08-03 PROCEDURE — 93010 ELECTROCARDIOGRAM REPORT: CPT | Mod: ,,, | Performed by: INTERNAL MEDICINE

## 2025-08-03 PROCEDURE — 96375 TX/PRO/DX INJ NEW DRUG ADDON: CPT

## 2025-08-03 PROCEDURE — 25500020 PHARM REV CODE 255: Performed by: PHYSICIAN ASSISTANT

## 2025-08-03 RX ORDER — CIPROFLOXACIN 500 MG/1
500 TABLET, FILM COATED ORAL 2 TIMES DAILY
Qty: 20 TABLET | Refills: 0 | Status: SHIPPED | OUTPATIENT
Start: 2025-08-03 | End: 2025-08-06 | Stop reason: ALTCHOICE

## 2025-08-03 RX ORDER — SODIUM CHLORIDE 9 MG/ML
1000 INJECTION, SOLUTION INTRAVENOUS
Status: COMPLETED | OUTPATIENT
Start: 2025-08-03 | End: 2025-08-03

## 2025-08-03 RX ORDER — METRONIDAZOLE 500 MG/1
500 TABLET ORAL EVERY 12 HOURS
Qty: 14 TABLET | Refills: 0 | Status: SHIPPED | OUTPATIENT
Start: 2025-08-03 | End: 2025-08-06 | Stop reason: ALTCHOICE

## 2025-08-03 RX ORDER — ONDANSETRON HYDROCHLORIDE 2 MG/ML
4 INJECTION, SOLUTION INTRAVENOUS
Status: COMPLETED | OUTPATIENT
Start: 2025-08-03 | End: 2025-08-03

## 2025-08-03 RX ORDER — MORPHINE SULFATE 4 MG/ML
4 INJECTION, SOLUTION INTRAMUSCULAR; INTRAVENOUS
Refills: 0 | Status: COMPLETED | OUTPATIENT
Start: 2025-08-03 | End: 2025-08-03

## 2025-08-03 RX ADMIN — ONDANSETRON 4 MG: 2 INJECTION INTRAMUSCULAR; INTRAVENOUS at 02:08

## 2025-08-03 RX ADMIN — SODIUM CHLORIDE 1000 ML: 9 INJECTION, SOLUTION INTRAVENOUS at 02:08

## 2025-08-03 RX ADMIN — IOHEXOL 100 ML: 350 INJECTION, SOLUTION INTRAVENOUS at 02:08

## 2025-08-03 RX ADMIN — MORPHINE SULFATE 4 MG: 4 INJECTION, SOLUTION INTRAMUSCULAR; INTRAVENOUS at 02:08

## 2025-08-03 RX ADMIN — PIPERACILLIN SODIUM AND TAZOBACTAM SODIUM 4.5 G: 4; .5 INJECTION, POWDER, LYOPHILIZED, FOR SOLUTION INTRAVENOUS at 03:08

## 2025-08-06 ENCOUNTER — OFFICE VISIT (OUTPATIENT)
Dept: PRIMARY CARE CLINIC | Facility: CLINIC | Age: 65
End: 2025-08-06

## 2025-08-06 VITALS
OXYGEN SATURATION: 97 % | BODY MASS INDEX: 24.96 KG/M2 | DIASTOLIC BLOOD PRESSURE: 75 MMHG | HEIGHT: 67 IN | WEIGHT: 159 LBS | HEART RATE: 94 BPM | SYSTOLIC BLOOD PRESSURE: 123 MMHG | TEMPERATURE: 99 F

## 2025-08-06 DIAGNOSIS — R42 VERTIGO: ICD-10-CM

## 2025-08-06 DIAGNOSIS — K52.9 COLITIS: Primary | ICD-10-CM

## 2025-08-06 DIAGNOSIS — R90.89 ABNORMAL CT OF BRAIN: ICD-10-CM

## 2025-08-06 DIAGNOSIS — S62.655A NONDISPLACED FRACTURE OF MIDDLE PHALANX OF LEFT RING FINGER, INITIAL ENCOUNTER FOR CLOSED FRACTURE: ICD-10-CM

## 2025-08-06 DIAGNOSIS — R55 VASOVAGAL SYNCOPE: ICD-10-CM

## 2025-08-06 RX ORDER — HYDROCODONE BITARTRATE AND ACETAMINOPHEN 7.5; 325 MG/1; MG/1
1 TABLET ORAL EVERY 6 HOURS PRN
Qty: 15 TABLET | Refills: 0 | Status: SHIPPED | OUTPATIENT
Start: 2025-08-06

## 2025-08-06 RX ORDER — CIPROFLOXACIN 500 MG/1
500 TABLET, FILM COATED ORAL 2 TIMES DAILY
Qty: 14 TABLET | Refills: 0 | Status: SHIPPED | OUTPATIENT
Start: 2025-08-06 | End: 2025-08-13

## 2025-08-06 RX ORDER — MECLIZINE HYDROCHLORIDE 25 MG/1
25 TABLET ORAL EVERY 6 HOURS
Qty: 20 TABLET | Refills: 1 | Status: SHIPPED | OUTPATIENT
Start: 2025-08-06

## 2025-08-06 RX ORDER — METRONIDAZOLE 500 MG/1
500 TABLET ORAL EVERY 12 HOURS
Qty: 14 TABLET | Refills: 0 | Status: SHIPPED | OUTPATIENT
Start: 2025-08-06 | End: 2025-08-13

## 2025-08-06 NOTE — ASSESSMENT & PLAN NOTE
Patient reports episodes of vertigo recently.    Start meclizine 25 mg: Take 1 every 6 hours for vertigo.  Change positions slowly and deliberately.    Patient is to let me know if symptoms persist, worsen or new symptoms develop.

## 2025-08-11 ENCOUNTER — TELEPHONE (OUTPATIENT)
Dept: PRIMARY CARE CLINIC | Facility: CLINIC | Age: 65
End: 2025-08-11
Payer: MEDICARE